# Patient Record
Sex: MALE | Race: WHITE | NOT HISPANIC OR LATINO | Employment: FULL TIME | URBAN - METROPOLITAN AREA
[De-identification: names, ages, dates, MRNs, and addresses within clinical notes are randomized per-mention and may not be internally consistent; named-entity substitution may affect disease eponyms.]

---

## 2019-03-27 ENCOUNTER — OFFICE VISIT CONVERTED (OUTPATIENT)
Dept: FAMILY MEDICINE CLINIC | Facility: CLINIC | Age: 59
End: 2019-03-27
Attending: FAMILY MEDICINE

## 2020-08-12 ENCOUNTER — HOSPITAL ENCOUNTER (OUTPATIENT)
Dept: URGENT CARE | Facility: CLINIC | Age: 60
Discharge: HOME | End: 2020-08-12

## 2020-08-14 LAB
BACTERIA SPEC AEROBE CULT: NORMAL
SARS-COV-2 RNA SPEC QL NAA+PROBE: DETECTED

## 2020-09-29 ENCOUNTER — HOSPITAL ENCOUNTER (OUTPATIENT)
Dept: URGENT CARE | Facility: CLINIC | Age: 60
Discharge: HOME | End: 2020-09-29
Attending: FAMILY MEDICINE

## 2020-10-02 LAB — SARS-COV-2 RNA SPEC QL NAA+PROBE: NOT DETECTED

## 2020-11-25 ENCOUNTER — OFFICE VISIT CONVERTED (OUTPATIENT)
Dept: FAMILY MEDICINE CLINIC | Facility: CLINIC | Age: 60
End: 2020-11-25
Attending: FAMILY MEDICINE

## 2021-05-13 NOTE — PROGRESS NOTES
Progress Note      Patient Name: Vahid Bass   Patient ID: [id number]   Sex: Male   YOB: 1960    Primary Care Provider: Bernard Singh DO    Visit Date: November 25, 2020    Provider: Bernard Singh DO   Location: [facility]   Location Address: [address] [place] [address], [address]  [place], [place]  [id number]   Location Phone: [phone]          Chief Complaint  · paperwork       History Of Present Illness  Vahid Bass is a 60 year old /Black male who presents for evaluation and treatment of:      Presents today for checkup.  Have not seen him since March 2019.  He had gone to [place] for over a year.  He returned back in August.  He reports a couple weeks after returning back in August he did contract COVID-19.  He had a follow-up test back on September/20 9/2020 where it was not detected.  He is at work again.  He is requesting a statement whether he is high risk.  He does have morbid obesity which would be a risk factor.  Paperwork has been filled out on his behalf today.  He does have arthritis of both knees particularly the right.  He received a corticosteroid injection a couple years ago on the right knee and reports having sharp pain occasionally.  [facility]  I discussed giving him some Voltaren gel.  He is unable to do physical therapy at this time due to work constraints.  If symptoms worsen he is instructed to call or return.  He is due for labs so I will order these as well.  I will see him back in 3 months and he will get labs done prior to his next appointment.       Past Medical History  Allergies; Anemia; Arthritis; Broken Bones; Hyperlipidemia; Tuberculosis or positive PPD test         Past Surgical History  Rhinoplasty; Tonsilectomy         Medication List  aspirin 81 mg oral tablet,chewable         Allergy List  NO KNOWN DRUG ALLERGIES       Allergies Reconciled  Family Medical History  Stroke; Heart Failure; Ovarian Cancer,  "Family History; Diabetes         Social History  Disabled; lives with children; lives with spouse; Retired; Tobacco (Never)         Review of Systems     Gen: Denies any fever, chills, or weight changes  HEENT: Denies any changes in vision or hearing, denies nasal congestion, denies any neck tenderness or lymphadenopathy  Extremities: Denies edema  Psychiatric: Denies any changes in mood or affect  Neurologic: Denies any deficits  Skin: Denies any rashes  Musculoskeletal: As discussed above       Vitals  Date Time BP Position Site L\R Cuff Size HR RR TEMP (F) WT  HT  BMI kg/m2 BSA m2 O2 Sat FR L/min FiO2 HC       11/25/2020 11:15 /72 Sitting    60 - R  98.3 301lbs 3oz 5'  11\" 42.01 2.62 95 %            Physical Examination     General: AAO 3, no acute distress, pleasant  HEENT: Normocephalic, atraumatic  Cardiovascular: Regular rate and rhythm without appreciable murmur  Respiratory: Clear to auscultation bilaterally no RRW  Gastrointestinal: Soft nontender nondistended with bowel sounds present  Musculoskeletal: Right knee demonstrates no effusion, negative valgus varus stress testing, negative Bong, negative anterior posterior drawer testing.  Positive single-leg declined squat test on the right.  extremities: No clubbing, cyanosis or edema  Neurologic: CN II through XII grossly intact   Psychiatric: Normal mood and affect           Assessment  · Anemia     285.9/D64.9  · Fatigue     780.79/R53.83  · Primary osteoarthritis of both knees     715.16/M17.0  · Screening for prostate cancer     V76.44/Z12.5  · HLD (hyperlipidemia)     272.4/E78.5  · Medication monitoring encounter     V58.83/Z51.81  Plan as documented above. I will see patient back in 3 months or sooner if needed. Patient to call with any questions or concerns. He will get labs updated prior to his next appointment. I will give him Voltaren gel for right knee pain. If symptoms worsen he is instructed to call or " return.      Plan  · Orders  o PSA Ultrasensitive, ANNUAL SCREENING [facility] ([id number], ) - V76.44/Z12.5 - 02/25/2021  o CBC with Auto Diff [facility] ([id number]) - 285.9/D64.9, V58.83/Z51.81, 780.79/R53.83 - 02/25/2021  o CMP [facility] ([id number]) - V58.83/Z51.81 - 02/25/2021  o Lipid Panel [facility] ([id number]) - 272.4/E78.5 - 02/25/2021  o Thyroid Profile (31806, 98504, THYII) - V58.83/Z51.81, 780.79/R53.83 - 02/25/2021  o ACO-39: Current medications updated and reviewed (1159F, ) - - 11/25/2020  · Medications  o Voltaren 1 % topical gel   SIG: apply 4 grams to the affected area(s) by topical route 4 times per day   DISP: (100) Gram with 2 refills  Prescribed on 11/25/2020     o Medications have been Reconciled  o Transition of Care or Provider Policy  · Instructions  o Patient was educated/instructed on their diagnosis, treatment and medications prior to discharge from the clinic today.  o Patient instructed to seek medical attention urgently for new or worsening symptoms.  o Call the office with any concerns or questions.  · Disposition  o Follow Up in 3 months.            Electronically Signed by: Bernard Singh DO -Author on November 25, 2020 12:10:11 PM

## 2021-05-14 VITALS
WEIGHT: 301.18 LBS | SYSTOLIC BLOOD PRESSURE: 132 MMHG | OXYGEN SATURATION: 95 % | DIASTOLIC BLOOD PRESSURE: 72 MMHG | BODY MASS INDEX: 42.16 KG/M2 | HEIGHT: 71 IN | HEART RATE: 60 BPM | TEMPERATURE: 98.3 F

## 2021-05-15 VITALS
DIASTOLIC BLOOD PRESSURE: 82 MMHG | OXYGEN SATURATION: 95 % | TEMPERATURE: 98.7 F | WEIGHT: 290.37 LBS | HEART RATE: 72 BPM | SYSTOLIC BLOOD PRESSURE: 134 MMHG | HEIGHT: 71 IN | BODY MASS INDEX: 40.65 KG/M2

## 2021-06-17 ENCOUNTER — OFFICE VISIT (OUTPATIENT)
Dept: FAMILY MEDICINE CLINIC | Facility: CLINIC | Age: 61
End: 2021-06-17

## 2021-06-17 VITALS
BODY MASS INDEX: 40.29 KG/M2 | DIASTOLIC BLOOD PRESSURE: 78 MMHG | OXYGEN SATURATION: 98 % | SYSTOLIC BLOOD PRESSURE: 116 MMHG | HEIGHT: 71 IN | WEIGHT: 287.8 LBS | HEART RATE: 63 BPM | TEMPERATURE: 98 F

## 2021-06-17 DIAGNOSIS — M79.672 BILATERAL FOOT PAIN: Primary | ICD-10-CM

## 2021-06-17 DIAGNOSIS — M21.42 FLAT FEET, BILATERAL: ICD-10-CM

## 2021-06-17 DIAGNOSIS — Z12.11 SCREENING FOR COLON CANCER: ICD-10-CM

## 2021-06-17 DIAGNOSIS — M21.41 FLAT FEET, BILATERAL: ICD-10-CM

## 2021-06-17 DIAGNOSIS — M79.671 BILATERAL FOOT PAIN: Primary | ICD-10-CM

## 2021-06-17 PROCEDURE — 99213 OFFICE O/P EST LOW 20 MIN: CPT | Performed by: FAMILY MEDICINE

## 2021-06-17 RX ORDER — ASPIRIN 81 MG/1
81 TABLET, CHEWABLE ORAL DAILY
COMMUNITY
End: 2022-08-12 | Stop reason: HOSPADM

## 2021-06-17 NOTE — PROGRESS NOTES
"Chief Complaint  Bilateral Foot pain    Subjective          Vahid Bass presents to [facility]  History of Present Illness  Patient presents complaining of bilateral foot pain.  This has been an issue for most of his life.  He does have flat feet.  The right things bother him more than the left.  He does sometimes have pain on the bottom of his feet but does not have them now.  He reports that his pain is worse at the end of the day.  Denies any excessive pain with the first steps of the day.  [facility] but states that normally mentions it but then nothing is done about it.  He has previously seen podiatry but it has been several years ago.  He reports he has had x-rays which show arthritis.  I did offer repeat imaging however patient declines.  Depression screening has been reviewed and is negative.  Objective   Vital Signs:   /78   Pulse 63   Temp 98 °F (36.7 °C)   Ht 180.3 cm (71\")   Wt 131 kg (287 lb 12.8 oz)   SpO2 98%   BMI 40.14 kg/m²     Physical Exam   General: AAO ×3, no acute distress, pleasant  HEENT: Normocephalic, atraumatic  Musculoskeletal: Significant pes planus of both feet with the right being worse than the left.  Ankle has good range of motion.  No tenderness to palpation on both feet.  Result Review :                 Assessment and Plan    Diagnoses and all orders for this visit:    1. Bilateral foot pain (Primary)  -     Ambulatory Referral to Podiatry    2. Screening for colon cancer    3. Flat feet, bilateral  -     Ambulatory Referral to Podiatry    Other orders  -     Diclofenac Sodium (Voltaren) 1 % gel gel; Apply to affected area QID prn pain  Dispense: 100 g; Refill: 3    I discussed with patient referral to podiatry.  I will give him Voltaren gel to help with pain.  I discussed with patient getting better supportive shoes to help with his pes planus.  I will see patient back in about 2 months.  He does appear " to have some issues with plantar fasciitis however I believe the main issue he is having is pain from pes planus.  He would like to hold off on x-rays at this time.  If he changes his mind he is to let us know.  His colonoscopy is up-to-date from 2018.  Repeat will be in 5 years, 2023.    Follow Up   Return in about 2 months (around 8/17/2021) for bilateral foot pain.  Patient was given instructions and counseling regarding his condition or for health maintenance advice. Please see specific information pulled into the AVS if appropriate.

## 2021-12-07 ENCOUNTER — TELEPHONE (OUTPATIENT)
Dept: FAMILY MEDICINE CLINIC | Facility: CLINIC | Age: 61
End: 2021-12-07

## 2021-12-07 NOTE — TELEPHONE ENCOUNTER
Pt had requested to transfer care from Dr Singh to Bucky Rao. This transfer has been approved by both provider. Unable to reach pt detailed message was left and if pt had any question he could call back too [phone].

## 2022-05-06 ENCOUNTER — OFFICE VISIT (OUTPATIENT)
Dept: FAMILY MEDICINE CLINIC | Facility: CLINIC | Age: 62
End: 2022-05-06

## 2022-05-06 VITALS
DIASTOLIC BLOOD PRESSURE: 88 MMHG | OXYGEN SATURATION: 97 % | HEART RATE: 63 BPM | SYSTOLIC BLOOD PRESSURE: 140 MMHG | WEIGHT: 300.6 LBS | BODY MASS INDEX: 42.08 KG/M2 | HEIGHT: 71 IN | TEMPERATURE: 98.8 F

## 2022-05-06 DIAGNOSIS — G89.29 CHRONIC PAIN OF BOTH KNEES: Primary | ICD-10-CM

## 2022-05-06 DIAGNOSIS — E66.01 CLASS 3 SEVERE OBESITY DUE TO EXCESS CALORIES WITH SERIOUS COMORBIDITY AND BODY MASS INDEX (BMI) OF 40.0 TO 44.9 IN ADULT: ICD-10-CM

## 2022-05-06 DIAGNOSIS — M25.571 CHRONIC PAIN OF BOTH ANKLES: ICD-10-CM

## 2022-05-06 DIAGNOSIS — G89.29 CHRONIC PAIN OF BOTH ANKLES: ICD-10-CM

## 2022-05-06 DIAGNOSIS — M25.562 CHRONIC PAIN OF BOTH KNEES: Primary | ICD-10-CM

## 2022-05-06 DIAGNOSIS — M25.561 CHRONIC PAIN OF BOTH KNEES: Primary | ICD-10-CM

## 2022-05-06 DIAGNOSIS — M25.572 CHRONIC PAIN OF BOTH ANKLES: ICD-10-CM

## 2022-05-06 PROBLEM — J01.80 OTHER ACUTE SINUSITIS: Status: ACTIVE | Noted: 2022-05-06

## 2022-05-06 PROBLEM — M19.90 ARTHRITIS: Status: ACTIVE | Noted: 2022-05-06

## 2022-05-06 PROBLEM — E78.5 HYPERLIPIDEMIA: Status: ACTIVE | Noted: 2022-05-06

## 2022-05-06 PROBLEM — D64.9 ANEMIA: Status: ACTIVE | Noted: 2022-05-06

## 2022-05-06 PROCEDURE — 99213 OFFICE O/P EST LOW 20 MIN: CPT | Performed by: NURSE PRACTITIONER

## 2022-05-06 RX ORDER — TAMSULOSIN HYDROCHLORIDE 0.4 MG/1
1 CAPSULE ORAL NIGHTLY
COMMUNITY
End: 2022-09-23

## 2022-05-06 NOTE — ASSESSMENT & PLAN NOTE
Patient's (Body mass index is 41.93 kg/m².) indicates that they are morbidly obese (BMI > 40 or > 35 with obesity - related health condition) with health conditions that include hypertension, dyslipidemias and osteoarthritis . Weight is unchanged. BMI is is above average; BMI management plan is completed. We discussed portion control and increasing exercise.

## 2022-05-06 NOTE — PROGRESS NOTES
"Chief Complaint  Ankle Pain (bilateral) and Knee Pain (Bilateral knees)    Subjective          Vahid Bass presents to [facility]  History of Present Illness  Presents today for an acute visit for bilateral knee and ankle pain.  [facility].  Patient reports 2 to 3 weeks ago he had [facility].  He states he has been on nonsteroidal anti-inflammatories and received joint injections.  He is requesting to see orthopedics and a podiatrist for his ankle and knee pain.  He is having decreased mobility due to the chronic pain.    Objective   Vital Signs:  /88   Pulse 63   Temp 98.8 °F (37.1 °C)   Ht 180.3 cm (71\")   Wt (!) 136 kg (300 lb 9.6 oz)   SpO2 97%   BMI 41.93 kg/m²           Physical Exam  Vitals reviewed.   Constitutional:       Appearance: Normal appearance. He is morbidly obese.   HENT:      Head: Normocephalic and atraumatic.      Right Ear: External ear normal.      Left Ear: External ear normal.      Mouth/Throat:      Pharynx: No oropharyngeal exudate.   Eyes:      Conjunctiva/sclera: Conjunctivae normal.      Pupils: Pupils are equal, round, and reactive to light.   Cardiovascular:      Rate and Rhythm: Normal rate and regular rhythm.      Heart sounds: No murmur heard.    No friction rub. No gallop.   Pulmonary:      Effort: Pulmonary effort is normal.      Breath sounds: Normal breath sounds. No wheezing or rhonchi.   Abdominal:      General: Bowel sounds are normal. There is no distension.      Palpations: Abdomen is soft.      Tenderness: There is no abdominal tenderness.   Skin:     General: Skin is warm and dry.   Neurological:      Mental Status: He is alert and oriented to person, place, and time.      Cranial Nerves: No cranial nerve deficit.   Psychiatric:         Mood and Affect: Mood and affect normal.         Behavior: Behavior normal.         Thought Content: Thought content " normal.         Judgment: Judgment normal.        Result Review :                 Assessment and Plan    Diagnoses and all orders for this visit:    1. Chronic pain of both knees (Primary)  -     Ambulatory Referral to Orthopedic Surgery    2. Chronic pain of both ankles  -     Ambulatory Referral to Podiatry    3. Class 3 severe obesity due to excess calories with serious comorbidity and body mass index (BMI) of 40.0 to 44.9 in adult (HCC)  Assessment & Plan:  Patient's (Body mass index is 41.93 kg/m².) indicates that they are morbidly obese (BMI > 40 or > 35 with obesity - related health condition) with health conditions that include hypertension, dyslipidemias and osteoarthritis . Weight is unchanged. BMI is is above average; BMI management plan is completed. We discussed portion control and increasing exercise.       He will picked up the x-rays of his knee from a couple weeks ago.  Put in consult for orthopedic and podiatry.  Discussed encourage weight loss.  Healthy eating and exercising.       Follow Up   Return in about 4 weeks (around 6/3/2022), or if symptoms worsen or fail to improve, for Next scheduled follow up.  Patient was given instructions and counseling regarding his condition or for health maintenance advice. Please see specific information pulled into the AVS if appropriate.

## 2022-05-13 ENCOUNTER — OFFICE VISIT (OUTPATIENT)
Dept: ORTHOPEDIC SURGERY | Facility: CLINIC | Age: 62
End: 2022-05-13

## 2022-05-13 ENCOUNTER — PREP FOR SURGERY (OUTPATIENT)
Dept: OTHER | Facility: HOSPITAL | Age: 62
End: 2022-05-13

## 2022-05-13 VITALS — HEART RATE: 61 BPM | OXYGEN SATURATION: 97 % | HEIGHT: 71 IN | BODY MASS INDEX: 42.28 KG/M2 | WEIGHT: 302 LBS

## 2022-05-13 DIAGNOSIS — M25.561 PAIN IN BOTH KNEES, UNSPECIFIED CHRONICITY: Primary | ICD-10-CM

## 2022-05-13 DIAGNOSIS — M17.0 PRIMARY OSTEOARTHRITIS OF KNEES, BILATERAL: Primary | ICD-10-CM

## 2022-05-13 DIAGNOSIS — M17.0 PRIMARY OSTEOARTHRITIS OF KNEES, BILATERAL: ICD-10-CM

## 2022-05-13 DIAGNOSIS — M25.562 PAIN IN BOTH KNEES, UNSPECIFIED CHRONICITY: Primary | ICD-10-CM

## 2022-05-13 PROBLEM — M17.12 OSTEOARTHRITIS OF LEFT KNEE: Status: ACTIVE | Noted: 2022-05-13

## 2022-05-13 PROCEDURE — 99203 OFFICE O/P NEW LOW 30 MIN: CPT | Performed by: ORTHOPAEDIC SURGERY

## 2022-05-13 RX ORDER — POVIDONE-IODINE 10 MG/ML
SOLUTION TOPICAL ONCE
Status: CANCELLED | OUTPATIENT
Start: 2022-05-13 | End: 2022-05-13

## 2022-05-13 RX ORDER — CEFAZOLIN SODIUM 2 G/100ML
2 INJECTION, SOLUTION INTRAVENOUS ONCE
Status: CANCELLED | OUTPATIENT
Start: 2022-05-13 | End: 2022-05-13

## 2022-05-13 RX ORDER — TRANEXAMIC ACID 10 MG/ML
1000 INJECTION, SOLUTION INTRAVENOUS ONCE
Status: CANCELLED | OUTPATIENT
Start: 2022-05-13 | End: 2022-05-13

## 2022-05-13 NOTE — PROGRESS NOTES
"Chief Complaint  Initial Evaluation of the Left Knee and Initial Evaluation of the Right Knee     Subjective      Vahid Bass presents to [facility] for evaluation of the bilateral knees. The patient reports bilateral knee pain. He is retired from the . [facility]. He reports the last steroid injection was in 2019. He has no other complaints. He is no longer getting relief from injections.     No Known Allergies     Social History     Socioeconomic History   • Marital status:    Tobacco Use   • Smoking status: Never Smoker   • Smokeless tobacco: Never Used   Vaping Use   • Vaping Use: Never used        Review of Systems     Objective   Vital Signs:   Pulse 61   Ht 180.3 cm (71\")   Wt (!) 137 kg (302 lb)   SpO2 97%   BMI 42.12 kg/m²       Physical Exam  Constitutional:       Appearance: Normal appearance. The patient is well-developed and normal weight.   HENT:      Head: Normocephalic.      Right Ear: Hearing and external ear normal.      Left Ear: Hearing and external ear normal.      Nose: Nose normal.   Eyes:      Conjunctiva/sclera: Conjunctivae normal.   Cardiovascular:      Rate and Rhythm: Normal rate.   Pulmonary:      Effort: Pulmonary effort is normal.      Breath sounds: No wheezing or rales.   Abdominal:      Palpations: Abdomen is soft.      Tenderness: There is no abdominal tenderness.   Musculoskeletal:      Cervical back: Normal range of motion.   Skin:     Findings: No rash.   Neurological:      Mental Status: The patient is alert and oriented to person, place, and time.   Psychiatric:         Mood and Affect: Mood and affect normal.         Judgment: Judgment normal.       Ortho Exam      Left knee- 10 degree varus deformity. Positive EHL, FHL, GS and TA. Sensation intact to all 5 nerves of the foot. Positive pulses. Stable to anterior/posterior drawer. Mild laxity to varus/valgus stress. ROM -5 to 115 degrees. Negative " Lachman's. Negative Bong's. Plantar- valgus deformity     Right knee- 10 degree varus deformity. Positive EHL, FHL, GS and TA. Sensation intact to all 5 nerves of the foot. Positive pulses. Stable to anterior/posterior drawer. Stable to varus/valgus stress. ROM -4 to 115 degrees. Negative Lachman's. Negative Bong's. Plantar- valgus deformity     Procedures    X-Ray Report:  Bilateral knee(s) X-Ray  Indication: Evaluation of bilateral knee pain  AP, Lateral, Standing and Sunrise view(s)  Findings: advanced degenerative changes with bone on bone articulation to the medial knee with varus deformity.   Prior studies available for comparison: no         Imaging Results (Most Recent)     Procedure Component Value Units Date/Time    XR Knee 3 View Bilateral [[id number]] Resulted: 05/13/22 0850     Updated: 05/13/22 0857           Result Review :       No results found.           Assessment and Plan     Diagnoses and all orders for this visit:    1. Pain in both knees, unspecified chronicity (Primary)  -     XR Knee 3 View Bilateral    2. Primary osteoarthritis of knees, bilateral        Discussed the treatment options with the patient, operative vs non-operative. Discussed the risks and benefits of a total knee replacement. Discussed the risks and benefits of a Left Total Knee Arthroplasty. The patient expressed understanding and wished to proceed.     Educated on risk of elevated BMI related to procedure.  Discussed options for weight loss/decreasing BMI prior to procedure including dietician consult, weight loss options and exercise program., Discussed surgery., Risks/benefits discussed with patient including, but not limited to: infection, bleeding, neurovascular damage, malunion, nonunion, aesthetic deformity, need for further surgery, and death., Discussed with patient the implant type being used during surgery and patient understands and desires to proceed., Surgery pamphlet given. and Call or return if  worsening symptoms.    Follow Up     2 weeks postoperatively.        Patient was given instructions and counseling regarding his condition or for health maintenance advice. Please see specific information pulled into the AVS if appropriate.     Scribed for Niles Amezquita MD by Neeru Zavaleta.  05/13/22   09:17 EDT    I have personally performed the services described in this document as scribed by the above individual and it is both accurate and complete. Niles Amezquita MD 05/15/22

## 2022-06-24 ENCOUNTER — TELEPHONE (OUTPATIENT)
Dept: FAMILY MEDICINE CLINIC | Facility: CLINIC | Age: 62
End: 2022-06-24

## 2022-06-24 ENCOUNTER — OFFICE VISIT (OUTPATIENT)
Dept: PODIATRY | Facility: CLINIC | Age: 62
End: 2022-06-24

## 2022-06-24 VITALS
HEART RATE: 59 BPM | BODY MASS INDEX: 41.55 KG/M2 | SYSTOLIC BLOOD PRESSURE: 134 MMHG | HEIGHT: 71 IN | WEIGHT: 296.8 LBS | DIASTOLIC BLOOD PRESSURE: 68 MMHG | TEMPERATURE: 95.3 F | OXYGEN SATURATION: 100 %

## 2022-06-24 DIAGNOSIS — M79.671 FOOT PAIN, RIGHT: Primary | ICD-10-CM

## 2022-06-24 DIAGNOSIS — M76.821 POSTERIOR TIBIAL TENDON DYSFUNCTION (PTTD) OF RIGHT LOWER EXTREMITY: ICD-10-CM

## 2022-06-24 DIAGNOSIS — M19.079 ANKLE ARTHRITIS: ICD-10-CM

## 2022-06-24 PROCEDURE — 99243 OFF/OP CNSLTJ NEW/EST LOW 30: CPT | Performed by: PODIATRIST

## 2022-06-24 NOTE — PROGRESS NOTES
[facility] - [facility]ODIATRY    Today's Date: 06/24/22    Patient Name: Vahid Bass  MRN: [id number]  CSN: [id number]  PCP: Rodolfo Rao APRN  Referring Provider: Rodolfo Rao APRN    SUBJECTIVE     Chief Complaint   Patient presents with   • Right Foot - Pain, Establish Care     HPI: Vahid Bass, a 61 y.o.male, presents to clinic.    New, Established, New Problem: New    Location: Right ankle    Duration:  2020    Onset: Insidious    Nature: Sore, sharp, achy    Stable, worsening, improving: Slowly worsening    Aggravating factors:  Patient relates pain is aggravated by shoe gear and ambulation.      Previous Treatment: None on right ankle.  Patient relates upcoming left knee replacement by Dr. Amezquita scheduled in August 2022    Patient denies any fevers, chills, nausea, vomiting, shortness of breath, nor any other constitutional signs nor symptoms.    No other pedal complaints at this time.      Past Medical History:   Diagnosis Date   • Allergies    • Anemia    • Arthritis    • Broken bones    • Foot pain, right    • Hyperlipidemia    • Positive skin test for tuberculosis     or positive PPD; INH x 9 months     Past Surgical History:   Procedure Laterality Date   • RHINOPLASTY  1999   • TONSILLECTOMY  1999     Family History   Problem Relation Age of Onset   • Cancer Mother         ovarian   • Stroke Mother    • Ovarian cancer Mother    • Diabetes Father    • Heart failure Sister    • Heart disease Brother      Social History     Socioeconomic History   • Marital status:    Tobacco Use   • Smoking status: Never Smoker   • Smokeless tobacco: Never Used   Vaping Use   • Vaping Use: Never used   Substance and Sexual Activity   • Alcohol use: Yes     Comment: socially   • Drug use: Never   • Sexual activity: Defer     No Known Allergies  Current Outpatient Medications   Medication Sig Dispense Refill   • aspirin 81 MG chewable tablet aspirin 81 mg oral tablet,chewable chew 1 tablet  (81 mg) by oral route once daily   Active     • tamsulosin (FLOMAX) 0.4 MG capsule 24 hr capsule Take 1 capsule by mouth Daily.       No current facility-administered medications for this visit.     Review of Systems   Constitutional: Negative.    Musculoskeletal:        Painful right ankle   All other systems reviewed and are negative.      OBJECTIVE     Vitals:    22 0739   BP: 134/68   Pulse: 59   Temp: 95.3 °F (35.2 °C)   SpO2: 100%       No results found for: WBC, RBC, HGB, HCT, MCV, MCH, MCHC, RDW, RDWSD, MPV, PLT, NEUTRORELPCT, LYMPHORELPCT, MONORELPCT, EOSRELPCT, BASORELPCT, AUTOIGPER, NEUTROABS, LYMPHSABS, MONOSABS, EOSABS, BASOSABS, AUTOIGNUM, NRBC      No results found for: GLUCOSE, BUN, CREATININE, EGFRIFNONA, EGFRIFAFRI, BCR, K, CO2, CALCIUM, PROTENTOTREF, ALBUMIN, LABIL2, BILIRUBIN, AST, ALT    Patient seen in no apparent distress.      PHYSICAL EXAM:     Foot/Ankle Exam:       General:   Appearance: obesity    Orientation: AAOx3    Affect: appropriate    Gait: unimpaired    Shoe Gear:  Casual shoes    VASCULAR      Right Foot Vascularity   Normal vascular exam    Dorsalis pedis:  1+  Posterior tibial:  1+  Skin Temperature: warm    Edema Gradin+ and non-pitting  CFT:  < 3 seconds  Pedal Hair Growth:  Absent  Varicosities: mild varicosities       Left Foot Vascularity   Normal vascular exam    Dorsalis pedis:  1+  Posterior tibial:  1+  Skin Temperature: warm    Edema Gradin+ and non-pitting  CFT:  < 3 seconds  Pedal Hair Growth:  Present  Varicosities: mild varicosities        NEUROLOGIC     Right Foot Neurologic   Normal sensation    Light touch sensation:  Normal  Vibratory sensation:  Normal  Hot/Cold sensation: normal    Protective Sensation using [place] Monofilament:  10     Left Foot Neurologic   Normal sensation    Light touch sensation:  Normal  Vibratory sensation:  Normal  Hot/cold sensation: normal    Protective Sensation using [place] Monofilament:   10     MUSCULOSKELETAL      Right Foot Musculoskeletal   Ecchymosis:  None  Tenderness comment:  Right ankle and along the posterior tibial tendon track.  No signs of edema, erythema, lymphangitis, nor signs of infection.    Arch:  Pes planus     MUSCLE STRENGTH     Right Foot Muscle Strength   Foot dorsiflexion:  4-  Foot plantar flexion:  4-  Foot inversion:  1  Foot eversion:  4-     Left Foot Muscle Strength   Foot dorsiflexion:  4  Foot plantar flexion:  4  Foot inversion:  4  Foot eversion:  4     RANGE OF MOTION      Right Foot Range of Motion   Foot and ankle ROM within normal limits    Right ankle active dorsiflexion: Limited.  ankle dorsiflexion pain    plantar flexion pain       Left Foot Range of Motion   Foot and ankle ROM within normal limits       DERMATOLOGIC     Right Foot Dermatologic   Skin: skin intact    Nails: normal       Left Foot Dermatologic   Skin: skin intact    Nails: normal        RADIOLOGY:      XR Ankle 3+ View Right    Result Date: 6/24/2022  Narrative: IN-OFFICE IMAGING:  Standing, weightbearing, 3 view, AP, MO, Lateral, right ankle Indication: Ankle pain Findings: Significant pes planus.  Significant degenerative changes seen through the ankle and midfoot midfoot.  Almost complete resorption of the talar head.  Inferior calcaneal enthesopathy.  Otherwise, no periosteal reactions nor osteolytic changes seen.  No occult fractures seen. Comparison: No comparison views available.       ASSESSMENT/PLAN     Diagnoses and all orders for this visit:    1. Foot pain, right (Primary)    2. Ankle arthritis  -     Ambulatory Referral to Orthopedic Surgery    3. Posterior tibial tendon dysfunction (PTTD) of right lower extremity        Comprehensive lower extremity examination and evaluation was performed.    Discussed findings and treatment plan including risks, benefits, and treatment options with patient in detail. Patient agreed with treatment plan.    Medications and allergies reviewed.   Reviewed available lab values along with other pertinent labs.  These were discussed with the patient.    The need for a referral to another physician was discussed with the patient.  They state understanding and agreement with this plan.  The patient is to referred to Dr. Armando Mccall for evaluation for possible right ankle fusion and/or pantalar fusion.    An After Visit Summary was printed and given to the patient at discharge, including (if requested) any available informative/educational handouts regarding diagnosis, treatment, or medications. All questions were answered to patient/family satisfaction. Should symptoms fail to improve or worsen they agree to call or return to clinic or to go to the Emergency Department. Discussed the importance of following up with any needed screening tests/labs/specialist appointments and any requested follow-up recommended by me today. Importance of maintaining follow-up discussed and patient accepts that missed appointments can delay diagnosis and potentially lead to worsening of conditions.    Return if symptoms worsen or fail to improve., or sooner if acute issues arise.    This document has been electronically signed by Jerome Valladares DPM on June 24, 2022 08:33 EDT

## 2022-06-26 DIAGNOSIS — M76.821 POSTERIOR TIBIAL TENDON DYSFUNCTION (PTTD) OF RIGHT LOWER EXTREMITY: Primary | ICD-10-CM

## 2022-07-19 DIAGNOSIS — M17.0 PRIMARY OSTEOARTHRITIS OF KNEES, BILATERAL: Primary | ICD-10-CM

## 2022-07-19 DIAGNOSIS — Z47.1 AFTERCARE FOLLOWING LEFT KNEE JOINT REPLACEMENT SURGERY: Primary | ICD-10-CM

## 2022-07-19 DIAGNOSIS — Z96.652 AFTERCARE FOLLOWING LEFT KNEE JOINT REPLACEMENT SURGERY: Primary | ICD-10-CM

## 2022-07-29 DIAGNOSIS — Z01.818 ENCOUNTER FOR PREADMISSION TESTING: Primary | ICD-10-CM

## 2022-07-29 RX ORDER — TRANEXAMIC ACID 10 MG/ML
2000 INJECTION, SOLUTION INTRAVENOUS ONCE
Status: CANCELLED | OUTPATIENT
Start: 2022-07-29

## 2022-08-01 ENCOUNTER — PRE-ADMISSION TESTING (OUTPATIENT)
Dept: PREADMISSION TESTING | Facility: HOSPITAL | Age: 62
End: 2022-08-01

## 2022-08-01 VITALS
HEIGHT: 71 IN | BODY MASS INDEX: 41.36 KG/M2 | TEMPERATURE: 97.3 F | DIASTOLIC BLOOD PRESSURE: 82 MMHG | WEIGHT: 295.42 LBS | SYSTOLIC BLOOD PRESSURE: 142 MMHG | HEART RATE: 62 BPM | OXYGEN SATURATION: 95 % | RESPIRATION RATE: 16 BRPM

## 2022-08-01 DIAGNOSIS — M17.0 PRIMARY OSTEOARTHRITIS OF KNEES, BILATERAL: ICD-10-CM

## 2022-08-01 DIAGNOSIS — Z01.818 ENCOUNTER FOR PREADMISSION TESTING: ICD-10-CM

## 2022-08-01 LAB
ALBUMIN SERPL-MCNC: 4 G/DL (ref 3.5–5.2)
ALBUMIN/GLOB SERPL: 1.4 G/DL
ALP SERPL-CCNC: 49 U/L (ref 39–117)
ALT SERPL W P-5'-P-CCNC: 11 U/L (ref 1–41)
ANION GAP SERPL CALCULATED.3IONS-SCNC: 6.6 MMOL/L (ref 5–15)
AST SERPL-CCNC: 11 U/L (ref 1–40)
BASOPHILS # BLD AUTO: 0.03 10*3/MM3 (ref 0–0.2)
BASOPHILS NFR BLD AUTO: 0.5 % (ref 0–1.5)
BILIRUB SERPL-MCNC: 0.2 MG/DL (ref 0–1.2)
BUN SERPL-MCNC: 14 MG/DL (ref 8–23)
BUN/CREAT SERPL: 13.7 (ref 7–25)
CALCIUM SPEC-SCNC: 9.7 MG/DL (ref 8.6–10.5)
CHLORIDE SERPL-SCNC: 109 MMOL/L (ref 98–107)
CO2 SERPL-SCNC: 24.4 MMOL/L (ref 22–29)
CREAT SERPL-MCNC: 1.02 MG/DL (ref 0.76–1.27)
DEPRECATED RDW RBC AUTO: 43.8 FL (ref 37–54)
EGFRCR SERPLBLD CKD-EPI 2021: 82.1 ML/MIN/1.73
EOSINOPHIL # BLD AUTO: 0.2 10*3/MM3 (ref 0–0.4)
EOSINOPHIL NFR BLD AUTO: 3.2 % (ref 0.3–6.2)
ERYTHROCYTE [DISTWIDTH] IN BLOOD BY AUTOMATED COUNT: 14.6 % (ref 12.3–15.4)
GLOBULIN UR ELPH-MCNC: 2.9 GM/DL
GLUCOSE SERPL-MCNC: 95 MG/DL (ref 65–99)
HBA1C MFR BLD: 5.5 % (ref 4.8–5.6)
HCT VFR BLD AUTO: 37.6 % (ref 37.5–51)
HGB BLD-MCNC: 12.4 G/DL (ref 13–17.7)
IMM GRANULOCYTES # BLD AUTO: 0.02 10*3/MM3 (ref 0–0.05)
IMM GRANULOCYTES NFR BLD AUTO: 0.3 % (ref 0–0.5)
INR PPP: 1.05 (ref 0.86–1.15)
LYMPHOCYTES # BLD AUTO: 2.36 10*3/MM3 (ref 0.7–3.1)
LYMPHOCYTES NFR BLD AUTO: 37.8 % (ref 19.6–45.3)
MCH RBC QN AUTO: 27.1 PG (ref 26.6–33)
MCHC RBC AUTO-ENTMCNC: 33 G/DL (ref 31.5–35.7)
MCV RBC AUTO: 82.3 FL (ref 79–97)
MONOCYTES # BLD AUTO: 0.46 10*3/MM3 (ref 0.1–0.9)
MONOCYTES NFR BLD AUTO: 7.4 % (ref 5–12)
NEUTROPHILS NFR BLD AUTO: 3.18 10*3/MM3 (ref 1.7–7)
NEUTROPHILS NFR BLD AUTO: 50.8 % (ref 42.7–76)
NRBC BLD AUTO-RTO: 0 /100 WBC (ref 0–0.2)
PLATELET # BLD AUTO: 257 10*3/MM3 (ref 140–450)
PMV BLD AUTO: 10 FL (ref 6–12)
POTASSIUM SERPL-SCNC: 3.8 MMOL/L (ref 3.5–5.2)
PROT SERPL-MCNC: 6.9 G/DL (ref 6–8.5)
PROTHROMBIN TIME: 13.9 SECONDS (ref 11.8–14.9)
RBC # BLD AUTO: 4.57 10*6/MM3 (ref 4.14–5.8)
SODIUM SERPL-SCNC: 140 MMOL/L (ref 136–145)
WBC NRBC COR # BLD: 6.25 10*3/MM3 (ref 3.4–10.8)

## 2022-08-01 PROCEDURE — 83036 HEMOGLOBIN GLYCOSYLATED A1C: CPT

## 2022-08-01 PROCEDURE — 36415 COLL VENOUS BLD VENIPUNCTURE: CPT

## 2022-08-01 PROCEDURE — 93005 ELECTROCARDIOGRAM TRACING: CPT

## 2022-08-01 PROCEDURE — 85610 PROTHROMBIN TIME: CPT

## 2022-08-01 PROCEDURE — 80053 COMPREHEN METABOLIC PANEL: CPT

## 2022-08-01 PROCEDURE — 85025 COMPLETE CBC W/AUTO DIFF WBC: CPT

## 2022-08-01 RX ORDER — CHOLECALCIFEROL (VITAMIN D3) 25 MCG
1000 CAPSULE ORAL
COMMUNITY

## 2022-08-01 ASSESSMENT — KOOS JR
KOOS JR SCORE: 31.307
KOOS JR SCORE: 22

## 2022-08-01 NOTE — SIGNIFICANT NOTE
PT ATTENDED TOTAL JOINT EDUCATION CLASS. EDUCATIONAL MATERIALS GIVEN AND SURGICAL SOAP. PT VERBALIZED UNDERSTANDING

## 2022-08-01 NOTE — DISCHARGE INSTRUCTIONS
IMPORTANT INSTRUCTIONS - PRE-ADMISSION TESTING  DO NOT EAT OR CHEW anything after midnight the night before your procedure.    You may have CLEAR liquids up to ____3__ hours prior to ARRIVAL time. INCLUDES 20 OZ GATORADE, NO RED  Take the following medications the morning of your procedure with JUST A SIP OF WATER:  _NONE______________________________________________________________________________________________________________________________________________________________________________________    DO NOT BRING your medications to the hospital with you, UNLESS something has changed since your PRE-Admission Testing appointment.  Hold all vitamins, supplements, and NSAIDS (Non- steroidal anti-inflammatory meds) for one week prior to surgery (you MAY take Tylenol or Acetaminophen). HOLD AFTER 8/3/22  If you are diabetic, check your blood sugar the morning of your procedure. If it is less than 70 or if you are feeling symptomatic, call the following number for further instructions: [phone] SAME DAY SURGERY WILL CALL YOUR ARRIVAL TIME 8/09/22 BY 4 P.M.______.  Use your inhalers/nebulizers as usual, the morning of your procedure. BRING YOUR INHALERS with you. NA  Bring your CPAP or BIPAP to hospital, ONLY IF YOU WILL BE SPENDING THE NIGHT.   Make sure you have a ride home and have someone who will stay with you the day of your procedure after you go home.  If you have any questions, please call your Pre-Admission Testing Nurse, _______JON_________ at [phone]___________.   SHOWER AS SCHEDULED AND DIRECTED ON PAGE 9 OF TOTAL JOINT BOOK.  BRING TOTAL JOINT EDUCATION BOOK  BACK WITH YOU THE DAY OF SURGERY.  Per anesthesia request, do not smoke for 24 hours before your procedure or as instructed by your surgeon.

## 2022-08-03 ENCOUNTER — ANESTHESIA EVENT (OUTPATIENT)
Dept: PERIOP | Facility: HOSPITAL | Age: 62
End: 2022-08-03

## 2022-08-04 ENCOUNTER — TELEPHONE (OUTPATIENT)
Dept: ORTHOPEDIC SURGERY | Facility: CLINIC | Age: 62
End: 2022-08-04

## 2022-08-04 NOTE — TELEPHONE ENCOUNTER
CALLED PATIENT AND LEFT A MESSAGE FOR PATIENT TO CALL ME.  PER DR GUPTA, PCP PATIENT CAN HOLD/STOP ASPIRIN FOR 3 DAYS PRIOR TO SURGERY

## 2022-08-09 LAB — QT INTERVAL: 436 MS

## 2022-08-10 ENCOUNTER — APPOINTMENT (OUTPATIENT)
Dept: GENERAL RADIOLOGY | Facility: HOSPITAL | Age: 62
End: 2022-08-10

## 2022-08-10 ENCOUNTER — ANESTHESIA (OUTPATIENT)
Dept: PERIOP | Facility: HOSPITAL | Age: 62
End: 2022-08-10

## 2022-08-10 ENCOUNTER — HOSPITAL ENCOUNTER (OUTPATIENT)
Facility: HOSPITAL | Age: 62
Discharge: HOME | End: 2022-08-12
Attending: ORTHOPAEDIC SURGERY | Admitting: ORTHOPAEDIC SURGERY

## 2022-08-10 DIAGNOSIS — R26.2 DIFFICULTY IN WALKING: Primary | ICD-10-CM

## 2022-08-10 DIAGNOSIS — Z78.9 DECREASED ACTIVITIES OF DAILY LIVING (ADL): ICD-10-CM

## 2022-08-10 DIAGNOSIS — M17.0 PRIMARY OSTEOARTHRITIS OF KNEES, BILATERAL: ICD-10-CM

## 2022-08-10 PROBLEM — Z96.652 S/P TOTAL KNEE REPLACEMENT, LEFT: Status: ACTIVE | Noted: 2022-08-10

## 2022-08-10 PROBLEM — M17.12 OSTEOARTHRITIS OF LEFT KNEE, UNSPECIFIED OSTEOARTHRITIS TYPE: Status: ACTIVE | Noted: 2022-08-10

## 2022-08-10 PROCEDURE — 25010000002 PROPOFOL 10 MG/ML EMULSION: Performed by: NURSE ANESTHETIST, CERTIFIED REGISTERED

## 2022-08-10 PROCEDURE — 25010000002 MORPHINE (PF) 10 MG/ML SOLUTION: Performed by: ORTHOPAEDIC SURGERY

## 2022-08-10 PROCEDURE — 94799 UNLISTED PULMONARY SVC/PX: CPT

## 2022-08-10 PROCEDURE — 97161 PT EVAL LOW COMPLEX 20 MIN: CPT

## 2022-08-10 PROCEDURE — 94761 N-INVAS EAR/PLS OXIMETRY MLT: CPT

## 2022-08-10 PROCEDURE — 25010000002 ONDANSETRON PER 1 MG: Performed by: NURSE ANESTHETIST, CERTIFIED REGISTERED

## 2022-08-10 PROCEDURE — 27447 TOTAL KNEE ARTHROPLASTY: CPT | Performed by: ORTHOPAEDIC SURGERY

## 2022-08-10 PROCEDURE — C1713 ANCHOR/SCREW BN/BN,TIS/BN: HCPCS | Performed by: ORTHOPAEDIC SURGERY

## 2022-08-10 PROCEDURE — 99203 OFFICE O/P NEW LOW 30 MIN: CPT | Performed by: INTERNAL MEDICINE

## 2022-08-10 PROCEDURE — 25010000002 ROPIVACAINE PER 1 MG: Performed by: ORTHOPAEDIC SURGERY

## 2022-08-10 PROCEDURE — 20985 CPTR-ASST DIR MS PX: CPT | Performed by: ORTHOPAEDIC SURGERY

## 2022-08-10 PROCEDURE — 73560 X-RAY EXAM OF KNEE 1 OR 2: CPT

## 2022-08-10 PROCEDURE — 25010000002 KETOROLAC TROMETHAMINE PER 15 MG: Performed by: ORTHOPAEDIC SURGERY

## 2022-08-10 PROCEDURE — 25010000002 EPINEPHRINE 1 MG/ML SOLUTION: Performed by: ORTHOPAEDIC SURGERY

## 2022-08-10 PROCEDURE — C1776 JOINT DEVICE (IMPLANTABLE): HCPCS | Performed by: ORTHOPAEDIC SURGERY

## 2022-08-10 PROCEDURE — 25010000002 CEFAZOLIN PER 500 MG: Performed by: ORTHOPAEDIC SURGERY

## 2022-08-10 PROCEDURE — 27447 TOTAL KNEE ARTHROPLASTY: CPT | Performed by: PHYSICIAN ASSISTANT

## 2022-08-10 PROCEDURE — 25010000002 FENTANYL CITRATE (PF) 50 MCG/ML SOLUTION: Performed by: NURSE ANESTHETIST, CERTIFIED REGISTERED

## 2022-08-10 PROCEDURE — 76942 ECHO GUIDE FOR BIOPSY: CPT | Performed by: ORTHOPAEDIC SURGERY

## 2022-08-10 PROCEDURE — 25010000002 DEXAMETHASONE PER 1 MG: Performed by: NURSE ANESTHETIST, CERTIFIED REGISTERED

## 2022-08-10 DEVICE — COMP FEM/KN PERSONA CR CMT COCR STD SZ11 LT: Type: IMPLANTABLE DEVICE | Site: KNEE | Status: FUNCTIONAL

## 2022-08-10 DEVICE — IMPLANTABLE DEVICE: Type: IMPLANTABLE DEVICE | Site: KNEE | Status: FUNCTIONAL

## 2022-08-10 DEVICE — STEM TIB/KN PERSONA CMT 5D SZF LT: Type: IMPLANTABLE DEVICE | Site: KNEE | Status: FUNCTIONAL

## 2022-08-10 DEVICE — CAP TOTL KN CMT PRIMARY W/ROSA: Type: IMPLANTABLE DEVICE | Site: KNEE | Status: FUNCTIONAL

## 2022-08-10 DEVICE — CMT BONE PALACOS R HI/VISC 1X40: Type: IMPLANTABLE DEVICE | Site: KNEE | Status: FUNCTIONAL

## 2022-08-10 DEVICE — ART/SRF KN PERSONA/VE PS EF 8TO11 12MM LT: Type: IMPLANTABLE DEVICE | Site: KNEE | Status: FUNCTIONAL

## 2022-08-10 RX ORDER — PROMETHAZINE HYDROCHLORIDE 25 MG/1
25 SUPPOSITORY RECTAL ONCE AS NEEDED
Status: DISCONTINUED | OUTPATIENT
Start: 2022-08-10 | End: 2022-08-10 | Stop reason: HOSPADM

## 2022-08-10 RX ORDER — DEXAMETHASONE SODIUM PHOSPHATE 4 MG/ML
INJECTION, SOLUTION INTRA-ARTICULAR; INTRALESIONAL; INTRAMUSCULAR; INTRAVENOUS; SOFT TISSUE AS NEEDED
Status: DISCONTINUED | OUTPATIENT
Start: 2022-08-10 | End: 2022-08-10 | Stop reason: SURG

## 2022-08-10 RX ORDER — OXYCODONE HYDROCHLORIDE 5 MG/1
5 TABLET ORAL
Status: DISCONTINUED | OUTPATIENT
Start: 2022-08-10 | End: 2022-08-10 | Stop reason: HOSPADM

## 2022-08-10 RX ORDER — EPHEDRINE SULFATE 50 MG/ML
INJECTION, SOLUTION INTRAVENOUS AS NEEDED
Status: DISCONTINUED | OUTPATIENT
Start: 2022-08-10 | End: 2022-08-10 | Stop reason: SURG

## 2022-08-10 RX ORDER — ROCURONIUM BROMIDE 10 MG/ML
INJECTION, SOLUTION INTRAVENOUS AS NEEDED
Status: DISCONTINUED | OUTPATIENT
Start: 2022-08-10 | End: 2022-08-10 | Stop reason: SURG

## 2022-08-10 RX ORDER — ONDANSETRON 2 MG/ML
4 INJECTION INTRAMUSCULAR; INTRAVENOUS EVERY 6 HOURS PRN
Status: DISCONTINUED | OUTPATIENT
Start: 2022-08-10 | End: 2022-08-12 | Stop reason: HOSPADM

## 2022-08-10 RX ORDER — ACETAMINOPHEN 500 MG
1000 TABLET ORAL EVERY 8 HOURS
Status: DISPENSED | OUTPATIENT
Start: 2022-08-10 | End: 2022-08-12

## 2022-08-10 RX ORDER — LIDOCAINE HYDROCHLORIDE 20 MG/ML
INJECTION, SOLUTION EPIDURAL; INFILTRATION; INTRACAUDAL; PERINEURAL AS NEEDED
Status: DISCONTINUED | OUTPATIENT
Start: 2022-08-10 | End: 2022-08-10 | Stop reason: SURG

## 2022-08-10 RX ORDER — BUPIVACAINE HYDROCHLORIDE AND EPINEPHRINE 5; 5 MG/ML; UG/ML
INJECTION, SOLUTION EPIDURAL; INTRACAUDAL; PERINEURAL
Status: COMPLETED | OUTPATIENT
Start: 2022-08-10 | End: 2022-08-10

## 2022-08-10 RX ORDER — GABAPENTIN 300 MG/1
300 CAPSULE ORAL ONCE
Status: COMPLETED | OUTPATIENT
Start: 2022-08-10 | End: 2022-08-10

## 2022-08-10 RX ORDER — HYDROCODONE BITARTRATE AND ACETAMINOPHEN 7.5; 325 MG/1; MG/1
2 TABLET ORAL EVERY 4 HOURS PRN
Status: DISCONTINUED | OUTPATIENT
Start: 2022-08-10 | End: 2022-08-12 | Stop reason: HOSPADM

## 2022-08-10 RX ORDER — CELECOXIB 100 MG/1
200 CAPSULE ORAL ONCE
Status: COMPLETED | OUTPATIENT
Start: 2022-08-10 | End: 2022-08-10

## 2022-08-10 RX ORDER — PROPOFOL 10 MG/ML
VIAL (ML) INTRAVENOUS AS NEEDED
Status: DISCONTINUED | OUTPATIENT
Start: 2022-08-10 | End: 2022-08-10 | Stop reason: SURG

## 2022-08-10 RX ORDER — TRANEXAMIC ACID 10 MG/ML
2000 INJECTION, SOLUTION INTRAVENOUS ONCE
Status: DISCONTINUED | OUTPATIENT
Start: 2022-08-10 | End: 2022-08-10 | Stop reason: CLARIF

## 2022-08-10 RX ORDER — CEFAZOLIN SODIUM IN 0.9 % NACL 3 G/100 ML
3 INTRAVENOUS SOLUTION, PIGGYBACK (ML) INTRAVENOUS EVERY 8 HOURS
Status: COMPLETED | OUTPATIENT
Start: 2022-08-10 | End: 2022-08-11

## 2022-08-10 RX ORDER — NALOXONE HCL 0.4 MG/ML
0.4 VIAL (ML) INJECTION
Status: DISCONTINUED | OUTPATIENT
Start: 2022-08-10 | End: 2022-08-12 | Stop reason: HOSPADM

## 2022-08-10 RX ORDER — GLYCOPYRROLATE 0.2 MG/ML
0.2 INJECTION INTRAMUSCULAR; INTRAVENOUS
Status: COMPLETED | OUTPATIENT
Start: 2022-08-10 | End: 2022-08-10

## 2022-08-10 RX ORDER — HYDROCODONE BITARTRATE AND ACETAMINOPHEN 7.5; 325 MG/1; MG/1
1 TABLET ORAL EVERY 4 HOURS PRN
Status: DISCONTINUED | OUTPATIENT
Start: 2022-08-10 | End: 2022-08-12 | Stop reason: HOSPADM

## 2022-08-10 RX ORDER — ACETAMINOPHEN 500 MG
1000 TABLET ORAL ONCE
Status: COMPLETED | OUTPATIENT
Start: 2022-08-10 | End: 2022-08-10

## 2022-08-10 RX ORDER — FENTANYL CITRATE 50 UG/ML
INJECTION, SOLUTION INTRAMUSCULAR; INTRAVENOUS AS NEEDED
Status: DISCONTINUED | OUTPATIENT
Start: 2022-08-10 | End: 2022-08-10 | Stop reason: SURG

## 2022-08-10 RX ORDER — ONDANSETRON 2 MG/ML
INJECTION INTRAMUSCULAR; INTRAVENOUS AS NEEDED
Status: DISCONTINUED | OUTPATIENT
Start: 2022-08-10 | End: 2022-08-10 | Stop reason: SURG

## 2022-08-10 RX ORDER — PHENYLEPHRINE HCL IN 0.9% NACL 1 MG/10 ML
SYRINGE (ML) INTRAVENOUS AS NEEDED
Status: DISCONTINUED | OUTPATIENT
Start: 2022-08-10 | End: 2022-08-10 | Stop reason: SURG

## 2022-08-10 RX ORDER — KETOROLAC TROMETHAMINE 15 MG/ML
15 INJECTION, SOLUTION INTRAMUSCULAR; INTRAVENOUS EVERY 6 HOURS
Status: DISCONTINUED | OUTPATIENT
Start: 2022-08-10 | End: 2022-08-10

## 2022-08-10 RX ORDER — SODIUM CHLORIDE 9 MG/ML
100 INJECTION, SOLUTION INTRAVENOUS CONTINUOUS
Status: DISCONTINUED | OUTPATIENT
Start: 2022-08-10 | End: 2022-08-12 | Stop reason: HOSPADM

## 2022-08-10 RX ORDER — DEXMEDETOMIDINE HYDROCHLORIDE 100 UG/ML
INJECTION, SOLUTION INTRAVENOUS AS NEEDED
Status: DISCONTINUED | OUTPATIENT
Start: 2022-08-10 | End: 2022-08-10 | Stop reason: SURG

## 2022-08-10 RX ORDER — MEPERIDINE HYDROCHLORIDE 25 MG/ML
12.5 INJECTION INTRAMUSCULAR; INTRAVENOUS; SUBCUTANEOUS
Status: DISCONTINUED | OUTPATIENT
Start: 2022-08-10 | End: 2022-08-10 | Stop reason: HOSPADM

## 2022-08-10 RX ORDER — PROMETHAZINE HYDROCHLORIDE 12.5 MG/1
25 TABLET ORAL ONCE AS NEEDED
Status: DISCONTINUED | OUTPATIENT
Start: 2022-08-10 | End: 2022-08-10 | Stop reason: HOSPADM

## 2022-08-10 RX ORDER — MIDAZOLAM HYDROCHLORIDE 1 MG/ML
2 INJECTION INTRAMUSCULAR; INTRAVENOUS ONCE
Status: DISCONTINUED | OUTPATIENT
Start: 2022-08-10 | End: 2022-08-10 | Stop reason: HOSPADM

## 2022-08-10 RX ORDER — CEFAZOLIN SODIUM IN 0.9 % NACL 3 G/100 ML
3 INTRAVENOUS SOLUTION, PIGGYBACK (ML) INTRAVENOUS ONCE
Status: COMPLETED | OUTPATIENT
Start: 2022-08-10 | End: 2022-08-10

## 2022-08-10 RX ORDER — ONDANSETRON 4 MG/1
4 TABLET, FILM COATED ORAL EVERY 6 HOURS PRN
Status: DISCONTINUED | OUTPATIENT
Start: 2022-08-10 | End: 2022-08-12 | Stop reason: HOSPADM

## 2022-08-10 RX ORDER — MAGNESIUM HYDROXIDE 1200 MG/15ML
LIQUID ORAL AS NEEDED
Status: DISCONTINUED | OUTPATIENT
Start: 2022-08-10 | End: 2022-08-10 | Stop reason: HOSPADM

## 2022-08-10 RX ORDER — ONDANSETRON 2 MG/ML
4 INJECTION INTRAMUSCULAR; INTRAVENOUS ONCE AS NEEDED
Status: DISCONTINUED | OUTPATIENT
Start: 2022-08-10 | End: 2022-08-10 | Stop reason: HOSPADM

## 2022-08-10 RX ORDER — POVIDONE-IODINE 10 MG/ML
SOLUTION TOPICAL ONCE
Status: COMPLETED | OUTPATIENT
Start: 2022-08-10 | End: 2022-08-10

## 2022-08-10 RX ORDER — SODIUM CHLORIDE, SODIUM LACTATE, POTASSIUM CHLORIDE, CALCIUM CHLORIDE 600; 310; 30; 20 MG/100ML; MG/100ML; MG/100ML; MG/100ML
9 INJECTION, SOLUTION INTRAVENOUS CONTINUOUS PRN
Status: DISCONTINUED | OUTPATIENT
Start: 2022-08-10 | End: 2022-08-12 | Stop reason: HOSPADM

## 2022-08-10 RX ORDER — CEFAZOLIN SODIUM 2 G/100ML
2 INJECTION, SOLUTION INTRAVENOUS ONCE
Status: DISCONTINUED | OUTPATIENT
Start: 2022-08-10 | End: 2022-08-10 | Stop reason: DRUGHIGH

## 2022-08-10 RX ADMIN — LIDOCAINE HYDROCHLORIDE 50 MG: 20 INJECTION, SOLUTION EPIDURAL; INFILTRATION; INTRACAUDAL; PERINEURAL at 09:05

## 2022-08-10 RX ADMIN — GLYCOPYRROLATE 0.2 MG: 0.2 INJECTION INTRAMUSCULAR; INTRAVENOUS at 06:37

## 2022-08-10 RX ADMIN — Medication 50 MCG: at 08:05

## 2022-08-10 RX ADMIN — ONDANSETRON 4 MG: 2 INJECTION INTRAMUSCULAR; INTRAVENOUS at 09:01

## 2022-08-10 RX ADMIN — POVIDONE-IODINE: 10 SOLUTION TOPICAL at 06:22

## 2022-08-10 RX ADMIN — PROPOFOL 50 MG: 10 INJECTION, EMULSION INTRAVENOUS at 07:26

## 2022-08-10 RX ADMIN — ACETAMINOPHEN 1000 MG: 500 TABLET ORAL at 06:36

## 2022-08-10 RX ADMIN — Medication 100 MCG: at 09:00

## 2022-08-10 RX ADMIN — PROPOFOL 150 MG: 10 INJECTION, EMULSION INTRAVENOUS at 07:12

## 2022-08-10 RX ADMIN — FENTANYL CITRATE 25 MCG: 50 INJECTION, SOLUTION INTRAMUSCULAR; INTRAVENOUS at 08:15

## 2022-08-10 RX ADMIN — Medication 50 MCG: at 09:03

## 2022-08-10 RX ADMIN — ACETAMINOPHEN 1000 MG: 500 TABLET ORAL at 20:41

## 2022-08-10 RX ADMIN — EPHEDRINE SULFATE 10 MG: 50 INJECTION INTRAVENOUS at 07:39

## 2022-08-10 RX ADMIN — Medication 100 MCG: at 07:50

## 2022-08-10 RX ADMIN — SODIUM CHLORIDE 100 ML/HR: 9 INJECTION, SOLUTION INTRAVENOUS at 12:54

## 2022-08-10 RX ADMIN — Medication 50 MCG: at 08:44

## 2022-08-10 RX ADMIN — ROCURONIUM BROMIDE 50 MG: 10 INJECTION INTRAVENOUS at 07:13

## 2022-08-10 RX ADMIN — ROCURONIUM BROMIDE 15 MG: 10 INJECTION INTRAVENOUS at 08:33

## 2022-08-10 RX ADMIN — SODIUM CHLORIDE, POTASSIUM CHLORIDE, SODIUM LACTATE AND CALCIUM CHLORIDE 9 ML/HR: 600; 310; 30; 20 INJECTION, SOLUTION INTRAVENOUS at 06:38

## 2022-08-10 RX ADMIN — BUPIVACAINE HYDROCHLORIDE AND EPINEPHRINE BITARTRATE 40 ML: 5; .005 INJECTION, SOLUTION EPIDURAL; INTRACAUDAL; PERINEURAL at 06:53

## 2022-08-10 RX ADMIN — DEXAMETHASONE SODIUM PHOSPHATE 8 MG: 4 INJECTION, SOLUTION INTRA-ARTICULAR; INTRALESIONAL; INTRAMUSCULAR; INTRAVENOUS; SOFT TISSUE at 07:24

## 2022-08-10 RX ADMIN — Medication 50 MCG: at 08:58

## 2022-08-10 RX ADMIN — EPHEDRINE SULFATE 10 MG: 50 INJECTION INTRAVENOUS at 09:02

## 2022-08-10 RX ADMIN — EPHEDRINE SULFATE 10 MG: 50 INJECTION INTRAVENOUS at 07:30

## 2022-08-10 RX ADMIN — GABAPENTIN 300 MG: 300 CAPSULE ORAL at 06:37

## 2022-08-10 RX ADMIN — FENTANYL CITRATE 25 MCG: 50 INJECTION, SOLUTION INTRAMUSCULAR; INTRAVENOUS at 07:43

## 2022-08-10 RX ADMIN — FENTANYL CITRATE 25 MCG: 50 INJECTION, SOLUTION INTRAMUSCULAR; INTRAVENOUS at 07:52

## 2022-08-10 RX ADMIN — Medication 100 MCG: at 08:40

## 2022-08-10 RX ADMIN — LIDOCAINE HYDROCHLORIDE 50 MG: 20 INJECTION, SOLUTION EPIDURAL; INFILTRATION; INTRACAUDAL; PERINEURAL at 07:12

## 2022-08-10 RX ADMIN — DEXMEDETOMIDINE HYDROCHLORIDE 10 MCG: 100 INJECTION, SOLUTION, CONCENTRATE INTRAVENOUS at 08:18

## 2022-08-10 RX ADMIN — Medication 3 G: at 07:17

## 2022-08-10 RX ADMIN — CELECOXIB 200 MG: 100 CAPSULE ORAL at 06:37

## 2022-08-10 RX ADMIN — SUGAMMADEX 200 MG: 100 INJECTION, SOLUTION INTRAVENOUS at 09:04

## 2022-08-10 RX ADMIN — CEFAZOLIN 3 G: 10 INJECTION, POWDER, FOR SOLUTION INTRAVENOUS; PARENTERAL at 15:00

## 2022-08-10 RX ADMIN — ROCURONIUM BROMIDE 25 MG: 10 INJECTION INTRAVENOUS at 07:54

## 2022-08-10 RX ADMIN — FENTANYL CITRATE 50 MCG: 50 INJECTION, SOLUTION INTRAMUSCULAR; INTRAVENOUS at 07:12

## 2022-08-10 RX ADMIN — KETOROLAC TROMETHAMINE 15 MG: 15 INJECTION, SOLUTION INTRAMUSCULAR; INTRAVENOUS at 12:53

## 2022-08-10 RX ADMIN — ACETAMINOPHEN 1000 MG: 500 TABLET ORAL at 12:54

## 2022-08-10 NOTE — PLAN OF CARE
Goal Outcome Evaluation:  Plan of Care Reviewed With: patient           Outcome Evaluation: Patient presents with decreased strength, transfers and ambulation.  Skilled physical therapy services will be required to address these mobility deficits.  Recommend outpt PT services upon discharge from the hospital

## 2022-08-10 NOTE — H&P
[facility]   HOSPITALIST HISTORY AND PHYSICAL  Date: 8/10/2022   Patient Name: Vahid Bass  : 1960  MRN: [id number]  Primary Care Physician:  Rodolfo Rao APRN  Date of admission: 8/10/2022    Subjective   Subjective     Chief Complaint: Consult for management of chronic medical comorbidities including hyperlipidemia, MARIANNE, BPH    HPI:    Vahid Bass is a 62 y.o. male with osteoarthritis of the left knee and persistent left knee pain that failed conservative management.  Underwent elective total left knee replacement on 8/10/2020 without complication.  Hospital service on board for management of chronic comorbidities including hyperlipidemia, BPH, MARIANNE.  Patient doing well postop, no acute issues.  On 3 L of oxygen with SpO2 96%. Hemodynamically stable.  Denies chest pain, shortness of air.  Tolerated liquids.  No nausea vomiting or abdominal pain.  Denies uncontrolled pain in the left hip/lower extremity.  No acute complaints.      Personal History     Past Medical History:  Osteoarthritis  Benign prostatic hyperplasia  Hyperlipidemia  MARIANNE      Past Surgical History:  Past Surgical History:   Procedure Laterality Date   • RHINOPLASTY     • TONSILLECTOMY       Family History:   Family History   Problem Relation Age of Onset   • Cancer Mother         ovarian   • Stroke Mother    • Ovarian cancer Mother    • Diabetes Father    • Heart failure Sister    • Heart disease Brother    • Malig Hyperthermia Neg Hx          Social History:   Past Surgical History:   Procedure Laterality Date   • RHINOPLASTY     • TONSILLECTOMY         Home Medications:  Cyanocobalamin, Vitamin D-3, aspirin, and tamsulosin    Allergies:  No Known Allergies    Review of Systems   All systems were reviewed and negative except for: Fatigue, nausea, left knee numbness    Objective   Objective     Vitals:   Temp:  [96.5 °F (35.8 °C)-97.9 °F (36.6 °C)] 97.9 °F (36.6 °C)  Heart Rate:  [46-83] 56  Resp:  [12-20]  13  BP: ()/(47-83) 100/56  Flow (L/min):  [3] 3    Physical Exam    Constitutional: Up in chair, awake, conversant, NAD   Eyes: Pupils equal, sclerae anicteric, no conjunctival injection   HENT: NCAT, mucous membranes moist   Neck: Supple, no thyromegaly, no lymphadenopathy, trachea midline   Respiratory: Clear to auscultation bilaterally, nonlabored respirations    Cardiovascular: RRR, no murmurs, rubs, or gallops, palpable pedal pulses bilaterally   Gastrointestinal: Positive bowel sounds, soft, nontender, nondistended   Musculoskeletal: No bilateral ankle edema, no clubbing or cyanosis to extremities   Neurologic: Aert, Cranial Nerves grossly intact to confrontation, speech clear   Skin: No rashes     Result Review    Result Review:  I have personally reviewed the results from the time of this admission to 8/10/2022 16:31 EDT and agree with these findings:  []  Laboratory  []  Microbiology  [x]  Radiology  XR Knee 1 or 2 View Left    Result Date: 8/10/2022  PROCEDURE: XR KNEE 1 OR 2 VW LEFT  COMPARISON: [facility], XR KNEE 3 VW BILATERAL, 5/13/2022, 8:57.  INDICATIONS: Post-Op Knee Arthoplasty  FINDINGS:  Images obtained following left knee arthroplasty.  Good alignment.  No fracture or destructive bone lesion.  Expected postoperative changes noted in the soft tissues.  Skin staples present       Status post left knee arthroplasty with no evidence of complication      VALDEMAR MAS MD       Electronically Signed and Approved By: VALDEMAR MAS MD on 8/10/2022 at 10:03             Peripheral Block    Result Date: 8/10/2022  Bolivar Coulter MD     8/10/2022  7:02 AM Peripheral Block Pre-sedation assessment completed: 8/10/2022 6:49 AM Patient reassessed immediately prior to procedure Patient location during procedure: pre-op Start time: 8/10/2022 6:49 AM Stop time: 8/10/2022 6:53 AM Reason for block: at surgeon's request and post-op pain management Performed by Anesthesiologist: Bolivar Coulter MD  Preanesthetic Checklist Completed: patient identified, IV checked, site marked, risks and benefits discussed, surgical consent, monitors and equipment checked, pre-op evaluation and timeout performed Prep: Pt Position: supine Sterile barriers:alcohol skin prep, partial drape, cap, washed/disinfected hands, mask and gloves Prep: ChloraPrep Patient monitoring: blood pressure monitoring, continuous pulse oximetry and EKG Procedure Sedation: yes Performed under: local infiltration Guidance:ultrasound guided and nerve stimulator ULTRASOUND INTERPRETATION. Using ultrasound guidance a 20 G gauge needle was placed in close proximity to the nerve, at which point, under ultrasound guidance anesthetic was injected in the area of the nerve and spread of the anesthesia was seen on ultrasound in close proximity thereto.  There were no abnormalities seen on ultrasound; a digital image was taken; and the patient tolerated the procedure with no complications. Images:still images obtained, printed/placed on chart Block Type:adductor canal block Injection Technique:single-shot Needle Type:echogenic Needle Gauge:20 G (4in) Resistance on Injection: none Medications Used: bupivacaine-EPINEPHrine PF (MARCAINE w/EPI) 0.5% -1:481725 injection, 40 mL Med administered at 8/10/2022 6:53 AM Medications Comment:precedex 50mcg added to above solution Post Assessment Injection Assessment: negative aspiration for heme, no paresthesia on injection and incremental injection Patient Tolerance:comfortable throughout block Complications:no Additional Notes The block or continuous infusion is requested by the referring physician for management of postoperative pain, or pain related to a procedure. Ultrasound guidance (deemed medically necessary). Painless injection, pt was awake and conversant during the procedure without complications. Needle and surrounding structures visualized throughout procedure. No adverse reactions or complications seen during  this period. Post-procedure image showed no signs of complication, and anatomy was consistent with an uncomplicated nerve blockade.     XR foot 3+ views right    Result Date: 7/14/2022  Standing x-rays of the left foot show a lateral talar first metatarsal angle of -48 degrees, calcaneal pitch 8 degrees, medial cuneiform height -12 mm, talonavicular uncoverage 60%    XR ankle 3+ views right    Result Date: 7/14/2022  Standing x-rays of the right ankle show 7 degrees of tibiotalar valgus.   There is a positive empty talar head sign.    []  EKG/Telemetry   []  Cardiology/Vascular   []  Pathology  []  Old records  []  Other:      Assessment & Plan   Assessment / Plan     Assessment/Plan:   Active Hospital Problems    Diagnosis  POA   • **S/P total knee replacement, left [Z96.652]  Not Applicable   • Osteoarthritis of left knee, unspecified osteoarthritis type [M17.12]  Yes   • BPH (benign prostatic hyperplasia) [N40.0]  Yes   • MARIANNE (obstructive sleep apnea) [G47.33]  Unknown         Postop day 0 left knee replacement  Complete periprocedural IV cefazolin  continue care with PO/IV narcotics, fluids,  antiemetics, bowel regimen per orders  Wean supplemental oxygen to maintain O2 saturation greater than 90%  Encourage incentive spirometry  Okay to use home MARIANNE machine  Resume home Flomax 0.4 mg daily  Home aspirin on hold  Eliquis 2.5 mg twice daily for DVT prophylax  PT evaluation  H/H, BMP in AM       DVT prophylaxis:  Medical and mechanical DVT prophylaxis orders are present.      Admission Status:  I believe this patient meets OUTPATIENT status.    Electronically signed by Abimael Bautista DO, 08/10/22, 4:31 PM EDT.

## 2022-08-10 NOTE — ANESTHESIA PREPROCEDURE EVALUATION
Anesthesia Evaluation     Patient summary reviewed and Nursing notes reviewed   no history of anesthetic complications:  NPO Solid Status: > 8 hours  NPO Liquid Status: > 2 hours           Airway   Mallampati: II  TM distance: >3 FB  Neck ROM: full  No difficulty expected  Dental      Pulmonary - normal exam    breath sounds clear to auscultation  (+) sleep apnea,   Cardiovascular - normal exam  Exercise tolerance: good (4-7 METS)    Rhythm: regular  Rate: normal    (+) DVT, hyperlipidemia,       Neuro/Psych- negative ROS  GI/Hepatic/Renal/Endo - negative ROS     Musculoskeletal     Abdominal    Substance History - negative use     OB/GYN negative ob/gyn ROS         Other   arthritis,                      Anesthesia Plan    ASA 3     general with block     (Pt prefers general over spinal)    Anesthetic plan, risks, benefits, and alternatives have been provided, discussed and informed consent has been obtained with: patient and spouse/significant other.        CODE STATUS:

## 2022-08-10 NOTE — ANESTHESIA PROCEDURE NOTES
Peripheral Block    Pre-sedation assessment completed: 8/10/2022 6:49 AM    Patient reassessed immediately prior to procedure    Patient location during procedure: pre-op  Start time: 8/10/2022 6:49 AM  Stop time: 8/10/2022 6:53 AM  Reason for block: at surgeon's request and post-op pain management  Performed by  Anesthesiologist: Bolivar Coulter MD  Preanesthetic Checklist  Completed: patient identified, IV checked, site marked, risks and benefits discussed, surgical consent, monitors and equipment checked, pre-op evaluation and timeout performed  Prep:  Pt Position: supine  Sterile barriers:alcohol skin prep, partial drape, cap, washed/disinfected hands, mask and gloves  Prep: ChloraPrep  Patient monitoring: blood pressure monitoring, continuous pulse oximetry and EKG  Procedure    Sedation: yes  Performed under: local infiltration  Guidance:ultrasound guided and nerve stimulator    ULTRASOUND INTERPRETATION. Using ultrasound guidance a 20 G gauge needle was placed in close proximity to the nerve, at which point, under ultrasound guidance anesthetic was injected in the area of the nerve and spread of the anesthesia was seen on ultrasound in close proximity thereto.  There were no abnormalities seen on ultrasound; a digital image was taken; and the patient tolerated the procedure with no complications. Images:still images obtained, printed/placed on chart    Block Type:adductor canal block  Injection Technique:single-shot  Needle Type:echogenic  Needle Gauge:20 G (4in)  Resistance on Injection: none    Medications Used: bupivacaine-EPINEPHrine PF (MARCAINE w/EPI) 0.5% -1:[id number] injection, 40 mL  Med administered at 8/10/2022 6:53 AM      Medications  Comment:precedex 50mcg added to above solution    Post Assessment  Injection Assessment: negative aspiration for heme, no paresthesia on injection and incremental injection  Patient Tolerance:comfortable throughout block  Complications:no  Additional Notes  The block or  continuous infusion is requested by the referring physician for management of postoperative pain, or pain related to a procedure. Ultrasound guidance (deemed medically necessary). Painless injection, pt was awake and conversant during the procedure without complications. Needle and surrounding structures visualized throughout procedure. No adverse reactions or complications seen during this period. Post-procedure image showed no signs of complication, and anatomy was consistent with an uncomplicated nerve blockade.

## 2022-08-10 NOTE — PLAN OF CARE
Goal Outcome Evaluation:      Pt S/P Left Total Knee Today. Pt has been stable since arrival, vitals have been WNL. Dressing dry and intact to left knee. Pt on ETCO2 monitor, has been WNL. Pain has been well controlled with oral pain medications, pt has voided without difficulty.

## 2022-08-10 NOTE — THERAPY EVALUATION
Acute Care - Physical Therapy Initial Evaluation   Chris     Patient Name: Vahid Bass  : 1960  MRN: [id number]  Today's Date: 8/10/2022     Admit date: 8/10/2022     Referring Physician: Abimael Bautista DO     Surgery Date:8/10/2022   Procedure(s) (LRB):  LEFT TOTAL KNEE ARTHROPLASTY WITH NETTE ROBOT (Left)            Visit Dx:     ICD-10-CM ICD-9-CM   1. Difficulty in walking  R26.2 719.7   2. Primary osteoarthritis of knees, bilateral  M17.0 715.16     Patient Active Problem List   Diagnosis   • Screening for colon cancer   • Anemia   • Arthritis   • Hyperlipidemia   • Other acute sinusitis   • Class 3 severe obesity due to excess calories with serious comorbidity and body mass index (BMI) of 40.0 to 44.9 in adult ([facility])   • Primary osteoarthritis of knees, bilateral   • Osteoarthritis of left knee   • Osteoarthritis of left knee, unspecified osteoarthritis type     Past Medical History:   Diagnosis Date   • Allergies    • Anemia     NO CURRENT S/S   • Arthritis    • BPH (benign prostatic hyperplasia)    • DVT (deep venous thrombosis) ([facility])      LOWER LEG, RESOLVED   • Foot pain, right    • Hyperlipidemia    • Positive skin test for tuberculosis     or positive PPD; INH x 9 months     Past Surgical History:   Procedure Laterality Date   • RHINOPLASTY     • TONSILLECTOMY       PT Assessment (last 12 hours)     PT Evaluation and Treatment     Row Name 08/10/22 1300          Physical Therapy Time and Intention    Subjective Information no complaints  -BOUBACAR     Document Type evaluation  -BOUBACAR     Mode of Treatment individual therapy;physical therapy  -BOUBACAR     Patient Effort good  -BOUBACAR     Row Name 08/10/22 1300          General Information    Patient Observations alert;cooperative;agree to therapy  -BOUBACAR     Prior Level of Function independent:;all household mobility;community mobility  -BOUBACAR     Equipment Currently Used at Home none  -BOUBACAR     Existing Precautions/Restrictions fall;weight bearing  -BOUBACAR      Barriers to Rehab none identified  -BOUBACAR     Row Name 08/10/22 1300          Living Environment    Current Living Arrangements home  -BOUBACAR     People in Home spouse  -BOUBACAR     Row Name 08/10/22 1300          Range of Motion (ROM)    Range of Motion left lower extremity  5-100° knee  -BOUBACAR     Row Name 08/10/22 1300          Strength (Manual Muscle Testing)    Strength (Manual Muscle Testing) left lower extremity  hip flexion 3/5 and knee extension 2-/5  -BOUBACAR     Row Name 08/10/22 1300          Mobility    Extremity Weight-bearing Status left lower extremity  -BOUBACAR     Left Lower Extremity (Weight-bearing Status) weight-bearing as tolerated (WBAT)  -BOUBACAR     Row Name 08/10/22 1300          Bed Mobility    Bed Mobility bed mobility (all) activities;supine-sit  -BOUBACAR     All Activities, [place] (Bed Mobility) moderate assist (50% patient effort)  -BOUBACAR     Supine-Sit [place] (Bed Mobility) moderate assist (50% patient effort)  -BOUBACAR     Row Name 08/10/22 1300          Transfers    Transfers bed-chair transfer;sit-stand transfer  -BOUBACAR     Bed-Chair [place] (Transfers) moderate assist (50% patient effort)  -BOUBACAR     Assistive Device (Bed-Chair Transfers) walker, front-wheeled  -BOUBACAR     Sit-Stand [place] (Transfers) moderate assist (50% patient effort)  -BOUBACAR     Row Name 08/10/22 1300          Sit-Stand Transfer    Assistive Device (Sit-Stand Transfers) walker, front-wheeled  -BOUBACAR     Row Name 08/10/22 1300          Gait/Stairs (Locomotion)    Gait/Stairs Locomotion gait/ambulation assistive device  -BOUBACAR     [place] Level (Gait) moderate assist (50% patient effort)  -BOUBACAR     Assistive Device (Gait) walker, front-wheeled  -BOUBACAR     Ambulated day of surgery or within 4 hours of PACU discharge yes  -BOUBACAR     Distance in Feet (Gait) 15  -BOUBACAR     Pattern (Gait) step-to  knee buckling severely with WB  -BOUBACAR     Row Name 08/10/22 1300          Safety Issues, Functional Mobility    Impairments Affecting Function (Mobility)  balance;endurance/activity tolerance;pain;range of motion (ROM);strength  -BOUBACAR     Row Name 08/10/22 1300          Balance    Balance Assessment standing dynamic balance  -BOUBACAR     Dynamic Standing Balance moderate assist  -BOUBACAR     Position/Device Used, Standing Balance walker, front-wheeled  -BOUBACAR     Row Name             Wound 08/10/22 0748 Left anterior knee Incision    Wound - Properties Group Placement Date: 08/10/22  -SC Placement Time: 0748  -SC Present on Hospital Admission: N  -SC Side: Left  -SC Orientation: anterior  -SC Location: knee  -SC Primary Wound Type: Incision  -SC     Retired Wound - Properties Group Placement Date: 08/10/22  -SC Placement Time: 0748  -SC Present on Hospital Admission: N  -SC Side: Left  -SC Orientation: anterior  -SC Location: knee  -SC Primary Wound Type: Incision  -SC     Retired Wound - Properties Group Date first assessed: 08/10/22  -SC Time first assessed: 0748  -SC Present on Hospital Admission: N  -SC Side: Left  -SC Location: knee  -SC Primary Wound Type: Incision  -SC     Row Name 08/10/22 1300          Plan of Care Review    Plan of Care Reviewed With patient  -BOUBACAR     Outcome Evaluation Patient presents with decreased strength, transfers and ambulation.  Skilled physical therapy services will be required to address these mobility deficits.  Recommend outpt PT services upon discharge from the hospital  -BOBUACAR     Row Name 08/10/22 1300          Therapy Assessment/Plan (PT)    Patient/Family Therapy Goals Statement (PT) Patient wants to recover and get his other knee done  -BOUBACAR     Rehab Potential (PT) good, to achieve stated therapy goals  -BOUBACAR     Criteria for Skilled Interventions Met (PT) skilled treatment is necessary  -BOUBACAR     Therapy Frequency (PT) 2 times/day  -BOUBACAR     Predicted Duration of Therapy Intervention (PT) 10 days  -BOUBACAR     Problem List (PT) problems related to;balance;mobility;strength;pain;range of motion (ROM)  -BOUBACAR     Activity Limitations Related to Problem List  (PT) unable to transfer safely;unable to ambulate safely  -BOUBACAR     Row Name 08/10/22 1300          PT Evaluation Complexity    History, PT Evaluation Complexity no personal factors and/or comorbidities  -BOUBACAR     Examination of Body Systems (PT Eval Complexity) total of 4 or more elements  -BOUBACAR     Clinical Presentation (PT Evaluation Complexity) stable  -BOUBACAR     Clinical Decision Making (PT Evaluation Complexity) low complexity  -BOUBACAR     Overall Complexity (PT Evaluation Complexity) low complexity  -BOUBACAR     Row Name 08/10/22 1300          Therapy Plan Review/Discharge Plan (PT)    Therapy Plan Review (PT) evaluation/treatment results reviewed;participants voiced agreement with care plan;participants included;patient  -BOUBACAR     Row Name 08/10/22 1300          Physical Therapy Goals    Transfer Goal Selection (PT) transfer, PT goal 1  -BOUBACAR     Gait Training Goal Selection (PT) gait training, PT goal 1  -BOUBACAR     Row Name 08/10/22 1300          Transfer Goal 1 (PT)    Activity/Assistive Device (Transfer Goal 1, PT) transfers, all  -BOUBACAR     [place] Level/Cues Needed (Transfer Goal 1, PT) independent  -BOUBACAR     Time Frame (Transfer Goal 1, PT) long term goal (LTG);10 days  -BOUBACAR     Row Name 08/10/22 1300          Gait Training Goal 1 (PT)    Activity/Assistive Device (Gait Training Goal 1, PT) gait (walking locomotion);assistive device use;walker, rolling  -BOUBACAR     [place] Level (Gait Training Goal 1, PT) independent  -BOUBACAR     Distance (Gait Training Goal 1, PT) 300  -BOUBACAR     Time Frame (Gait Training Goal 1, PT) long term goal (LTG);10 days  -BOUBACAR           User Key  (r) = Recorded By, (t) = Taken By, (c) = Cosigned By    Initials Name Provider Type    Elana Hussein, RN Registered Nurse    Umair Oconnell, PT Physical Therapist                Physical Therapy Education                 Title: PT OT SLP Therapies (Done)     Topic: Physical Therapy (Done)     Point: Mobility training (Done)     Learning Progress Summary            Patient Acceptance, E,TB, VU by BOUBACAR at 8/10/2022 1354                   Point: Precautions (Done)     Learning Progress Summary           Patient Acceptance, E,TB, VU by BOUBACAR at 8/10/2022 1354                               User Key     Initials Effective Dates Name Provider Type Discipline    BOUBACAR 06/03/21 -  Umair Zhang PT Physical Therapist PT              PT Recommendation and Plan  Anticipated Discharge Disposition (PT): home with outpatient therapy services  Planned Therapy Interventions (PT): balance training, bed mobility training, gait training, home exercise program, stair training, strengthening, transfer training  Therapy Frequency (PT): 2 times/day  Plan of Care Reviewed With: patient  Outcome Evaluation: Patient presents with decreased strength, transfers and ambulation.  Skilled physical therapy services will be required to address these mobility deficits.  Recommend outpt PT services upon discharge from the hospital   Outcome Measures     Row Name 08/10/22 1300             How much help from another person do you currently need...    Turning from your back to your side while in flat bed without using bedrails? 2  -BOUBACAR      Moving from lying on back to sitting on the side of a flat bed without bedrails? 2  -BOUBACAR      Moving to and from a bed to a chair (including a wheelchair)? 2  -BOUBACAR      Standing up from a chair using your arms (e.g., wheelchair, bedside chair)? 2  -BOUBACAR      Climbing 3-5 steps with a railing? 2  -BOUBACAR      To walk in hospital room? 2  -BOUBACAR      AM-PAC 6 Clicks Score (PT) 12  -BOUBACAR              Functional Assessment    Outcome Measure Options AM-PAC 6 Clicks Basic Mobility (PT)  -BOUBACAR            User Key  (r) = Recorded By, (t) = Taken By, (c) = Cosigned By    Initials Name Provider Type    Umair Oconnell PT Physical Therapist                 Time Calculation:    PT Charges     Row Name 08/10/22 3065             Time Calculation    PT Received On 08/10/22  -BOUBACAR      PT Goal  Re-Cert Due Date 08/19/22  -BOUBACAR              Untimed Charges    PT Eval/Re-eval Minutes 35  -BOUBACAR              Total Minutes    Untimed Charges Total Minutes 35  -BOUBACAR       Total Minutes 35  -BOUBACAR            User Key  (r) = Recorded By, (t) = Taken By, (c) = Cosigned By    Initials Name Provider Type    Umair Oconnell, PT Physical Therapist              Therapy Charges for Today     Code Description Service Date Service Provider Modifiers Qty    31031772497 HC PT EVAL LOW COMPLEXITY 3 8/10/2022 Umair Zhang, PT GP 1          PT G-Codes  Outcome Measure Options: AM-PAC 6 Clicks Basic Mobility (PT)  AM-PAC 6 Clicks Score (PT): 12    Umair Zhang PT  8/10/2022

## 2022-08-10 NOTE — ANESTHESIA POSTPROCEDURE EVALUATION
Patient: Vahid Bass    Procedure Summary     Date: 08/10/22 Room / Location: [facility] OR 06 / [facility] MAIN OR    Anesthesia Start: 0707 Anesthesia Stop: 0924    Procedure: LEFT TOTAL KNEE ARTHROPLASTY WITH NETTE ROBOT (Left Knee) Diagnosis:       Primary osteoarthritis of knees, bilateral      (Primary osteoarthritis of knees, bilateral [M17.0])    Surgeons: Niles Amezquita MD Provider: Balwinder Thornton MD    Anesthesia Type: general with block ASA Status: 3          Anesthesia Type: general with block    Vitals  Vitals Value Taken Time   /65 08/10/22 1007   Temp 36.1 °C (97 °F) 08/10/22 1005   Pulse 72 08/10/22 1009   Resp 16 08/10/22 1005   SpO2 98 % 08/10/22 1009   Vitals shown include unvalidated device data.        Post Anesthesia Care and Evaluation    Patient location during evaluation: bedside  Patient participation: complete - patient participated  Level of consciousness: awake  Pain management: adequate    Airway patency: patent  Anesthetic complications: No anesthetic complications  PONV Status: none  Cardiovascular status: acceptable and stable  Respiratory status: acceptable  Hydration status: acceptable    Comments: An Anesthesiologist personally participated in the most demanding procedures (including induction and emergence if applicable) in the anesthesia plan, monitored the course of anesthesia administration at frequent intervals and remained physically present and available for immediate diagnosis and treatment of emergencies.

## 2022-08-10 NOTE — H&P
[facility]   HISTORY AND PHYSICAL    Patient Name: Vahid Bass  : 1960  MRN: [id number]  Primary Care Physician:  Rodolfo Rao APRN  Date of admission: (Not on file)    Subjective   Subjective     Chief Complaint: Left knee pain    History of Present Illness: The patient is a 62-year-old male with left knee osteoarthritis.  He reports pain, stiffness, difficulty ambulating.  The pain is worse with activity.  He has failed conservative measures and tried extensive nonoperative treatment.  He wishes to undergo a left total knee replacement.    Review of Systems : Negative except as mentioned in history of present illness    Personal History     Past Medical History:   Diagnosis Date   • Allergies    • Anemia     NO CURRENT S/S   • Arthritis    • BPH (benign prostatic hyperplasia)    • DVT (deep venous thrombosis) ([facility])      LOWER LEG, RESOLVED   • Foot pain, right    • Hyperlipidemia    • Positive skin test for tuberculosis     or positive PPD; INH x 9 months       Past Surgical History:   Procedure Laterality Date   • RHINOPLASTY     • TONSILLECTOMY         Family History: family history includes Cancer in his mother; Diabetes in his father; Heart disease in his brother; Heart failure in his sister; Ovarian cancer in his mother; Stroke in his mother. Otherwise pertinent FHx was reviewed and not pertinent to current issue.    Social History:  reports that he has never smoked. He has never used smokeless tobacco. He reports current alcohol use. He reports that he does not use drugs.    Home Medications:  Cyanocobalamin, Vitamin D-3, aspirin, and tamsulosin    Allergies:  No Known Allergies    Objective    Objective     Vitals:        Physical Exam: General: No apparent distress, alert and oriented x3  HEENT: Normocephalic/atraumatic  Neck: Supple  Cardiovascular: Regular heart rate  Chest: Unlabored breathing  Abdomen: Soft, nontender nondistended  Musculoskeletal: Neurovascular intact to  extremity.  Positive pulses.  Decreased range of motion of the knee.  Stability intact.  Tender to palpation.  Mild swelling.  Neurological: Grossly intact    Result Review    Result Review:  I have personally reviewed the results from the time of this admission to 8/9/2022 20:46 EDT and agree with these findings:  []  Laboratory list / accordion  []  Microbiology  [x]  Radiology  []  EKG/Telemetry   []  Cardiology/Vascular   []  Pathology  []  Old records  []  Other:  Most notable findings include: Advanced degenerative changes of the left knee      Assessment & Plan   Assessment / Plan     Brief Patient Summary:  Vahid Bass is a 62 y.o. male who has left knee osteoarthritis    Active Hospital Problems:  Active Hospital Problems    Diagnosis    • **Primary osteoarthritis of knees, bilateral      Plan:   We discussed treatment options with the patient.  Discussed operative versus nonoperative treatment.  Informed consent obtained and patient wishes to proceed with left total knee replacement.  Risk and benefits of surgery were discussed.    DVT prophylaxis:  No DVT prophylaxis order currently exists.    CODE STATUS:       Admission Status:  I believe this patient meets outpatient status.    Niles Amezquita MD

## 2022-08-10 NOTE — BRIEF OP NOTE
TOTAL KNEE ARTHROPLASTY WITH NETTE ROBOT  Progress Note    Vahid Bass  8/10/2022    Pre-op Diagnosis:   Primary osteoarthritis of knees, bilateral [M17.0]     Left knee osteoarthritis  Post-Op Diagnosis Codes:     * Primary osteoarthritis of knees, bilateral [M17.0]   Left knee osteoarthritis    Procedure/CPT® Codes:        Procedure(s):  LEFT TOTAL KNEE ARTHROPLASTY WITH NETTE ROBOT    Surgeon(s):  Niles Amezquita MD    Anesthesia: General with Block    Staff:   Circulator: Elana Gonzalez RN  Scrub Person: Irvin Chauhan  Assistant: Leslie Trinh; Korey Aponte PA  Other: Courtney Barrera RN  Assistant: Leslie Trinh; Korey Aponte PA      Estimated Blood Loss: 75 mL    Urine Voided: * No values recorded between 8/10/2022  7:06 AM and 8/10/2022  9:19 AM *    Specimens:                None          Drains: * No LDAs found *    Findings: Left knee osteoarthritis        Complications: None    Assistant: Leslie Trinh; Korey Aponte PA  was responsible for performing the following activities: Retraction, Suction, Irrigation and Placing Dressing and their skilled assistance was necessary for the success of this case.    Niles Amezquita MD     Date: 8/10/2022  Time: 09:21 EDT

## 2022-08-11 PROBLEM — Z96.652 S/P TOTAL KNEE REPLACEMENT, LEFT: Status: RESOLVED | Noted: 2022-08-10 | Resolved: 2022-08-11

## 2022-08-11 LAB
HCT VFR BLD AUTO: 36.1 % (ref 37.5–51)
HGB BLD-MCNC: 11.7 G/DL (ref 13–17.7)

## 2022-08-11 PROCEDURE — 85014 HEMATOCRIT: CPT | Performed by: ORTHOPAEDIC SURGERY

## 2022-08-11 PROCEDURE — 97535 SELF CARE MNGMENT TRAINING: CPT

## 2022-08-11 PROCEDURE — 97116 GAIT TRAINING THERAPY: CPT

## 2022-08-11 PROCEDURE — 97150 GROUP THERAPEUTIC PROCEDURES: CPT

## 2022-08-11 PROCEDURE — 97165 OT EVAL LOW COMPLEX 30 MIN: CPT

## 2022-08-11 PROCEDURE — 94799 UNLISTED PULMONARY SVC/PX: CPT

## 2022-08-11 PROCEDURE — 25010000002 CEFAZOLIN PER 500 MG: Performed by: ORTHOPAEDIC SURGERY

## 2022-08-11 PROCEDURE — 85018 HEMOGLOBIN: CPT | Performed by: ORTHOPAEDIC SURGERY

## 2022-08-11 PROCEDURE — 94760 N-INVAS EAR/PLS OXIMETRY 1: CPT

## 2022-08-11 PROCEDURE — 99213 OFFICE O/P EST LOW 20 MIN: CPT | Performed by: INTERNAL MEDICINE

## 2022-08-11 RX ORDER — ASPIRIN 325 MG
325 TABLET, DELAYED RELEASE (ENTERIC COATED) ORAL DAILY
Qty: 14 TABLET | Refills: 0 | Status: SHIPPED | OUTPATIENT
Start: 2022-08-11 | End: 2022-09-09

## 2022-08-11 RX ORDER — HYDROCODONE BITARTRATE AND ACETAMINOPHEN 7.5; 325 MG/1; MG/1
1-2 TABLET ORAL EVERY 4 HOURS PRN
Qty: 40 TABLET | Refills: 0 | Status: SHIPPED | OUTPATIENT
Start: 2022-08-11 | End: 2022-09-09

## 2022-08-11 RX ADMIN — HYDROCODONE BITARTRATE AND ACETAMINOPHEN 2 TABLET: 7.5; 325 TABLET ORAL at 20:40

## 2022-08-11 RX ADMIN — CEFAZOLIN 3 G: 10 INJECTION, POWDER, FOR SOLUTION INTRAVENOUS; PARENTERAL at 00:00

## 2022-08-11 RX ADMIN — HYDROCODONE BITARTRATE AND ACETAMINOPHEN 1 TABLET: 7.5; 325 TABLET ORAL at 13:05

## 2022-08-11 RX ADMIN — APIXABAN 2.5 MG: 2.5 TABLET, FILM COATED ORAL at 20:41

## 2022-08-11 RX ADMIN — APIXABAN 2.5 MG: 2.5 TABLET, FILM COATED ORAL at 08:09

## 2022-08-11 RX ADMIN — HYDROCODONE BITARTRATE AND ACETAMINOPHEN 2 TABLET: 7.5; 325 TABLET ORAL at 09:05

## 2022-08-11 RX ADMIN — HYDROCODONE BITARTRATE AND ACETAMINOPHEN 2 TABLET: 7.5; 325 TABLET ORAL at 04:57

## 2022-08-11 NOTE — OP NOTE
TOTAL KNEE ARTHROPLASTY WITH NETTE ROBOT  Procedure Report    Patient Name:  Vahid Bass  YOB: 1960    Date of Surgery:  8/10/2022     Indications:  The patient is a 62-year-old male with left knee osteoarthritis.  The patient has failed conservative measures and wishes to undergo left total knee replacement.  Risk and benefits of surgery were discussed.  Risk of bleeding, infection, damage to neurovascular structures, heart attack, stroke, DVT/PE, anesthesia complications including death, continued pain disability, need for additional procedures among others were discussed.  Informed consent was obtained and he wished to proceed.    Pre-op Diagnosis:   Primary osteoarthritis of knees, bilateral [M17.0]   Left knee osteoarthritis       Post-Op Diagnosis Codes:     * Primary osteoarthritis of knees, bilateral [M17.0]   Left knee osteoarthritis    Procedure/CPT® Codes:      Procedure(s):  LEFT TOTAL KNEE ARTHROPLASTY WITH NETTE ROBOT    Staff:  Surgeon(s):  Niles Amezquita MD    Assistant: Leslie Trinh; Korey Aponte PA    Anesthesia: General with Block    Estimated Blood Loss: 75 mL    Implants:    Implant Name Type Inv. Item Serial No.  Lot No. LRB No. Used Action   CMT BONE PALACOS R HI/VISC 1X40 - [id number] Implant CMT BONE PALACOS R HI/VISC 1X40  [facility] [id number] Left 2 Implanted   STEM TIB PERSONA CMT 5D SZF LT - [id number] Implant STEM TIB PERSONA CMT 5D SZF LT  [employer] [id number] Left 1 Implanted   PAT PERSONA ALLPOLY CMT 38MM - [id number] Implant PAT PERSONA ALLPOLY CMT 38MM  [employer] [id number] Left 1 Implanted   COMP FEM/KN PERSONA CR CMT COCR STD SZ11 LT - [id number] Implant COMP FEM/KN PERSONA CR CMT COCR STD SZ11 LT  [employer] [id number] Left 1 Implanted   ART/SRF KN PERSONA/VE PS EF 8TO11 12MM LT - [id number] Implant ART/SRF KN PERSONA/VE PS EF 8TO11 12MM LT  [employer] [id number] Left 1 Implanted       Specimen:          None         Findings: Left knee osteoarthritis    Complications: None    Description of Procedure: The patient was brought to the operating room and placed supine on the OR table.  General anesthesia was given.  Preoperatively, the patient received an adductor canal nerve block. The patient was placed supine on the OR table.  All bony prominences were padded. The tourniquet was placed on the thigh. The affected lower extremity was prepped and draped in the usual sterile fashion. Preoperative antibiotic was given. Tranexamic acid intravenously was given at the beginning and the end of the surgery.  At this point, an Esmarch was used to exsanguinate the limb and the tourniquet was inflated. A longitudinal incision was made over the knee and a medial parapatellar arthrotomy was performed.  Appropriate releases were performed to the proximal medial tibia. The pre-patellar fat pad was excised.  The patella was subluxed laterally and the knee was examined. There was severe tricompartmental wear and osteophyte formation.  The medial and lateral menisci were removed.  Osteophytes of  the femur were debrided.     At this point 2 threaded guidepins were placed at the distal femur and the tracking sensor for the femur for the Dinora robot were placed.  2 percutaneous stab incisions were made in the proximal tibial shaft and 2 guidepins were placed for the tibial tracking censor was placed.  We then registered the mechanical axis and femoral and tibial landmarks for the Dinora Robot.  We then assessed the flexion extension gaps and the flexion and extension deformity.  We made our surgical plan and then executed the distal femoral cut, the 4-in-1 femoral cut and the tibial cut.  The patella was then everted, resected, and sized.  Drill holes for the patella was made.  At this point the spacer block was placed in flexion extension and fit well.     We then placed the knee in flexion where laminar spreaders were used to open the flexion gaps.   The menisci were removed.  Osteophytes were removed from the posterior femur. The posterior capsule was injected with anesthetic cocktail.  At this point, the appropriately sized tibial tray was chosen.  This was pinned into place in line with the medial one third of the tibial tubercle. We then placed the trial femur. The trial insert was placed and the knee was brought to full extension. It was stable to varus and valgus stress.   The knee was then trialed with range of motion again. The femoral drill holes were made. The tibia was finished with the drill and the punch.  The components were removed. The knee was irrigated and dried. The cement was mixed and the tibia and femur were cemented into place.  The medial congruent spacer was then inserted.  The patella was cemented into place. The knee was irrigated with Irrisept and normal saline Pulsavac lavage.  The medial congruent polyethylene was inserted. The knee was stable to varus and valgus stress. There was good balance to the ligaments medially and laterally. There was good flexion with a stable patella. At this point, the tourniquet was released.  There was minimal bleeding controlled with electrocautery. The arthrotomy was closed with #1 Vicryl at 30 degrees of flexion, the subcutaneous tissues with 2-0 Vicryl, and the skin with staples.  The percutaneous stab incisions on the tibia were closed with 3-0 nylon in horizontal mattress fashion.  These incisions were covered with Xeroform, 4 x 4, Tegaderm.  An Aquacel dressing was placed and an Ace wrap was placed. Patient awoke from anesthesia in stable condition. There were no complications. All counts were correct.          Assistant: Leslie Trinh; Korey Aponte PA  was responsible for performing the following activities: Retraction, Suction, Irrigation and Placing Dressing and their skilled assistance was necessary for the success of this case.    Niles Amezquita MD     Date:  8/10/2022  Time: 20:57 EDT

## 2022-08-11 NOTE — PROGRESS NOTES
[facility]   Hospitalist Progress Note  Date: 2022  Patient Name: Vahid Bass  : 1960  MRN: [id number]  Date of admission: 8/10/2022      Subjective   Subjective     Chief Complaint: Follow up consult for management of chronic medical comorbidities including hyperlipidemia, MARIANNE, BPH    Interval Followup: No issues overnight.  Vital stable.  Labs unremarkable.  Weaned to room air.  Tolerating oral intake.  Left knee pain tolerable with oral narcotic regimen.  Worked with physical therapy this morning and this afternoon, they recommended he stay an additional as they feel he is not strong enough to go home today.      Review of Systems  Constitutional:  No Fever, No Chills  Respiratory:  No Cough, No Dyspnea  Gastrointestinal:  No Nausea, No Vomiting  Neuro:  + Weakness, No confusion    Objective   Objective     Vitals:   Temp:  [96 °F (35.6 °C)-98.7 °F (37.1 °C)] 98.7 °F (37.1 °C)  Heart Rate:  [53-68] 59  Resp:  [10-20] 10  BP: (100-146)/(56-74) 117/57  Flow (L/min):  [3] 3  Physical Exam    Constitutional: Awake, conversant, NAD   Eyes: Pupils equal and reactive, no conjunctival injection   HENT: NCAT, moist mucous membranes   Neck: Supple, trachea midline   Respiratory: Clear to auscultation bilaterally, nonlabored respirations    Cardiovascular: RRR, no murmurs, no pedal edema   Gastrointestinal: Positive bowel sounds, soft, nontender, nondistended   Musculoskeletal: Left knee dressing C/D/I, no clubbing or cyanosis to extremities   Neurologic: Oriented x 3, Cranial Nerves grossly intact speech clear   Skin: Warm and dry, no rashes     Result Review    Result Review:  I have personally reviewed the results from the time of this admission to 2022 13:38 EDT and agree with these findings:  [x]  Laboratory   CBC    CBC 22   WBC 6.25    RBC 4.57    Hemoglobin 12.4 (A) 11.7 (A)   Hematocrit 37.6 36.1 (A)   MCV 82.3    MCH 27.1    MCHC 33.0    RDW 14.6    Platelets 257    (A)  Abnormal value            BMP    BMP 8/1/22   BUN 14   Creatinine 1.02   Sodium 140   Potassium 3.8   Chloride 109 (A)   CO2 24.4   Calcium 9.7   (A) Abnormal value            []  Microbiology  []  Radiology  []  EKG/Telemetry   []  Cardiology/Vascular   []  Pathology  []  Old records  []  Other:    Assessment & Plan   Assessment / Plan     Assessment/Plan:  Active Hospital Problems    Diagnosis  POA   • Osteoarthritis of left knee, unspecified osteoarthritis type [M17.12]  Yes   • BPH (benign prostatic hyperplasia) [N40.0]  Yes   • MARIANNE (obstructive sleep apnea) [G47.33]  Unknown       Postop day 1 left knee replacement  Stop IV fluids   Continue as needed p.o./IV narcotic regimen per orders  Continue bowel regimen  Encourage incentive spirometry  Home NIPPV at night and with naps  Continue Flomax 0.4 mg daily  Eliquis 2.5 mg twice daily for DVT prophylaxis  Continue PT/OT.  Patient will benefit from another day in the hospital for therapy per PT   Discussed plan with RN.    DVT prophylaxis:  Medical and mechanical DVT prophylaxis orders are present.    Electronically signed by Abimael Bautista DO, 08/11/22, 1:38 PM EDT.

## 2022-08-11 NOTE — THERAPY EVALUATION
Patient Name: Vahid Bass  : 1960    MRN: [id number]                              Today's Date: 2022       Admit Date: 8/10/2022    Visit Dx:     ICD-10-CM ICD-9-CM   1. Difficulty in walking  R26.2 719.7   2. Primary osteoarthritis of knees, bilateral  M17.0 715.16   3. Decreased activities of daily living (ADL)  Z78.9 V49.89     Patient Active Problem List   Diagnosis   • Screening for colon cancer   • Anemia   • Arthritis   • Hyperlipidemia   • Other acute sinusitis   • Class 3 severe obesity due to excess calories with serious comorbidity and body mass index (BMI) of 40.0 to 44.9 in adult ([facility])   • Osteoarthritis of left knee   • Osteoarthritis of left knee, unspecified osteoarthritis type   • BPH (benign prostatic hyperplasia)   • MARIANNE (obstructive sleep apnea)     Past Medical History:   Diagnosis Date   • Allergies    • Anemia     NO CURRENT S/S   • Arthritis    • BPH (benign prostatic hyperplasia)    • DVT (deep venous thrombosis) ([facility])      LOWER LEG, RESOLVED   • Foot pain, right    • Hyperlipidemia    • Positive skin test for tuberculosis     or positive PPD; INH x 9 months     Past Surgical History:   Procedure Laterality Date   • RHINOPLASTY     • TONSILLECTOMY     • TOTAL KNEE ARTHROPLASTY Left 8/10/2022    Procedure: LEFT TOTAL KNEE ARTHROPLASTY WITH NETTE ROBOT;  Surgeon: Niles Amezquita MD;  Location: [facility];  Service: Robotics - Ortho;  Laterality: Left;      General Information     Row Name 22 1413 22 1351       OT Time and Intention    Document Type therapy note (daily note)  -ES evaluation  -ES    Mode of Treatment individual therapy;occupational therapy  -ES individual therapy;occupational therapy  -ES    Row Name 22 1355          General Information    Patient Profile Reviewed yes  -ES     Prior Level of Function independent:  Patient reports independent with B and IADLs. Patient is employed. No device for mobility. Walk in shower,  standing shower completion. [place] in stance. No home O2 use.  -ES     Existing Precautions/Restrictions fall;weight bearing  -ES     Row Name 08/11/22 1351          Occupational Profile    Reason for Services/Referral (Occupational Profile) Patient is 62 yr old male admitted to [facility] on 8/10/2022 after failed conservative management of left knee osteoarthritis.  Patient is postop day 1 left total knee arthroplasty, weightbearing as tolerated left lower extremity. OT evaluation and treatment ordered d/t recent decline in ADLs/transfer ability. No previous OT services for current condition.  -ES     Row Name 08/11/22 1351          Living Environment    People in Home spouse  -ES     Row Name 08/11/22 1351          Home Main Entrance    Number of Stairs, Main Entrance one  -ES     Row Name 08/11/22 1351          Stairs Within Home, Primary    Stairs, Within Home, Primary set of stairs to second floor bedroom  -ES     Number of Stairs, Within Home, Primary twelve  -ES     Row Name 08/11/22 1413 08/11/22 1351       Cognition    Orientation Status (Cognition) --  patient receptive to all education and training provided. All questions addressed  -ES oriented x 4  Patient pleasant and cooperative, agreeable to therapy evaluation  -ES    Row Name 08/11/22 1413 08/11/22 1351       Safety Issues, Functional Mobility    Impairments Affecting Function (Mobility) balance;endurance/activity tolerance;pain;range of motion (ROM);strength  -ES balance;endurance/activity tolerance;pain;range of motion (ROM);strength  -ES          User Key  (r) = Recorded By, (t) = Taken By, (c) = Cosigned By    Initials Name Provider Type    ES Sapna Landin OT Occupational Therapist                 Mobility/ADL's     Row Name 08/11/22 1354          Bed Mobility    Comment, (Bed Mobility) Patient met seated in recliner at therapy arrival to room  -ES     Row Name 08/11/22 1414 08/11/22 1354       Transfers    Transfers -- sit-stand  transfer;stand-sit transfer  -ES    Comment, (Transfers) patient provided transfer training education on hand placement, body mechanics and RWX management with transfers from varying surfaces to ensure patient safety at time of discharge  -ES --    Sit-Stand [place] (Transfers) -- contact guard;minimum assist (75% patient effort);1 person assist;verbal cues  -ES    Stand-Sit [place] (Transfers) -- contact guard;1 person assist;verbal cues  -ES    Row Name 08/11/22 1354          Sit-Stand Transfer    Assistive Device (Sit-Stand Transfers) walker, front-wheeled  -ES     Row Name 08/11/22 1354          Stand-Sit Transfer    Assistive Device (Stand-Sit Transfers) walker, front-wheeled  -ES     Row Name 08/11/22 1414          Functional Mobility    Functional Mobility- Comment Patient initiates mobility towards sink for grooming in stance, patient tolerates 2 steps towards sink prior to knee buckling and patient requesting to return to seated position. Patient bends L knee with mobility, educated to maintain normal mobility pattern to increase patient safety and success with functional mobility.  -ES     Row Name 08/11/22 1414 08/11/22 [facility]4       Activities of Daily Living    BADL Assessment/Intervention upper body dressing;lower body dressing;grooming  -ES bathing;upper body dressing;lower body dressing;grooming;feeding;toileting  -    Row Name 08/11/22 1354          Mobility    Extremity Weight-bearing Status left lower extremity  -ES     Left Lower Extremity (Weight-bearing Status) weight-bearing as tolerated (WBAT)  -     Row Name 08/11/22 1354          Bathing Assessment/Intervention    [place] Level (Bathing) bathing skills;moderate assist (50% patient effort)  -ES     Row Name 08/11/22 1414 08/11/22 [facility]       Upper Body Dressing Assessment/Training    [place] Level (Upper Body Dressing) upper body dressing skills;don;pull-over garment;set up;independent  -ES upper body dressing skills;set  up  -ES    Position (Upper Body Dressing) unsupported sitting  -ES --    Row Name 08/11/22 1414 08/11/22 1354       Lower Body Dressing Assessment/Training    [place] Level (Lower Body Dressing) lower body dressing skills;don;pants/bottoms;shoes/slippers;socks;moderate assist (50% patient effort)  -ES lower body dressing skills;minimum assist (75% patient effort)  -ES    Comment, (Lower Body Dressing) patient provided education and training on adaptive lower body dressing strategies to incrase patient independence. Return demonstration provided for ensured understanding.  -ES --    Row Name 08/11/22 1414 08/11/22 1354       Grooming Assessment/Training    [place] Level (Grooming) grooming skills;oral care regimen;set up  -ES grooming skills;set up  -ES    Position (Grooming) unsupported sitting  -ES --    Comment, (Grooming) Patient is unable to complete ADLs standing at sink secondary to deficits with mobility. s/u provided in sitting position  -ES --    Row Name 08/11/22 1354          Self-Feeding Assessment/Training    [place] Level (Feeding) feeding skills;set up  -ES     Row Name 08/11/22 1354          Toileting Assessment/Training    [place] Level (Toileting) toileting skills;contact guard assist  -ES           User Key  (r) = Recorded By, (t) = Taken By, (c) = Cosigned By    Initials Name Provider Type    Sapna Rizvi OT Occupational Therapist               Obj/Interventions     Row Name 08/11/22 1355          Sensory Assessment (Somatosensory)    Sensory Assessment (Somatosensory) bilateral UE  -ES     Bilateral UE Sensory Assessment general sensation;intact  -ES     Row Name 08/11/22 1355          Vision Assessment/Intervention    Visual Impairment/Limitations WFL  -ES     Row Name 08/11/22 1355          Range of Motion Comprehensive    General Range of Motion bilateral upper extremity ROM WNL  -ES     Row Name 08/11/22 1355          Strength Comprehensive (MMT)    General Manual  Muscle Testing (MMT) Assessment no strength deficits identified  -ES     Comment, General Manual Muscle Testing (MMT) Assessment bilateral upper extremities assessed 4+/5 with no strength deficits noted at time of evaluation  -ES     Row Name 08/11/22 1355          Motor Skills    Motor Skills functional endurance  -ES     Functional Endurance fair minus  -ES     Row Name 08/11/22 1418 08/11/22 1355       Balance    Balance Assessment -- sitting dynamic balance;standing dynamic balance  -ES    Dynamic Sitting Balance -- independent  -ES    Position, Sitting Balance -- unsupported;sitting in chair  -ES    Dynamic Standing Balance -- minimal assist;1-person assist  -ES    Position/Device Used, Standing Balance -- supported;walker, front-wheeled  -ES    Balance Interventions sitting;standing;sit to stand;dynamic;moderate challenge;occupation based/functional task;weight shifting activity  -ES --          User Key  (r) = Recorded By, (t) = Taken By, (c) = Cosigned By    Initials Name Provider Type    ES Sapna Landin OT Occupational Therapist               Goals/Plan     Row Name 08/11/22 1405          Transfer Goal 1 (OT)    Activity/Assistive Device (Transfer Goal 1, OT) transfers, all;walker, rolling  -ES     [place] Level/Cues Needed (Transfer Goal 1, OT) modified independence  -ES     Time Frame (Transfer Goal 1, OT) long term goal (LTG);10 days  -ES     Row Name 08/11/22 1405          Bathing Goal 1 (OT)    Activity/Device (Bathing Goal 1, OT) bathing skills, all;shower chair  -ES     [place] Level/Cues Needed (Bathing Goal 1, OT) modified independence  -ES     Time Frame (Bathing Goal 1, OT) long term goal (LTG);10 days  -ES     Row Name 08/11/22 1405          Dressing Goal 1 (OT)    Activity/Device (Dressing Goal 1, OT) dressing skills, all  -ES     [place]/Cues Needed (Dressing Goal 1, OT) modified independence  -ES     Time Frame (Dressing Goal 1, OT) long term goal (LTG);10 days  -ES     Row  Name 08/11/22 1405          Toileting Goal 1 (OT)    Activity/Device (Toileting Goal 1, OT) toileting skills, all;commode, 3-in-1  -ES     [place] Level/Cues Needed (Toileting Goal 1, OT) modified independence  -ES     Time Frame (Toileting Goal 1, OT) long term goal (LTG);10 days  -ES     Row Name 08/11/22 1405          Grooming Goal 1 (OT)    Activity/Device (Grooming Goal 1, OT) grooming skills, all  -ES     [place] (Grooming Goal 1, OT) independent  -ES     Time Frame (Grooming Goal 1, OT) long term goal (LTG);10 days  -ES     Row Name 08/11/22 1405          Problem Specific Goal 1 (OT)    Problem Specific Goal 1 (OT) Patient will demonstrate fair plus dynamic activity tolerance with standing ADL task to demonstrate functional tolerance required for independent ADL routine completion at time of discharge  -ES     Time Frame (Problem Specific Goal 1, OT) long term goal (LTG);10 days  -ES     Row Name 08/11/22 1405          Therapy Assessment/Plan (OT)    Planned Therapy Interventions (OT) activity tolerance training;BADL retraining;functional balance retraining;occupation/activity based interventions;patient/caregiver education/training;transfer/mobility retraining;IADL retraining  -ES           User Key  (r) = Recorded By, (t) = Taken By, (c) = Cosigned By    Initials Name Provider Type    ES Sapna Landin, DEEDEE Occupational Therapist               Clinical Impression     Row Name 08/11/22 1400          Pain Assessment    Pretreatment Pain Rating 8/10  -ES     Posttreatment Pain Rating 10/10  -ES     Pain Location - Side/Orientation Left  -ES     Pain Location - knee  -ES     Pain Intervention(s) Nursing Notified;Medication (See MAR);MD notified (Comment)  Hospitalist in room, aware of patient pain levels  -ES     Row Name 08/11/22 1419 08/11/22 1400       Plan of Care Review    Plan of Care Reviewed With -- patient  -ES    Progress -- no change  -ES    Outcome Evaluation Patient provided education and  "training on use of adaptive strategies to increase patient safety and independence with B and IADL task engagement, transfer training to maxamize patient safety with all functional transfers, and home modification for patient success and independence at time of discharge. Patient receptive to all education and training provided.  -ES Patient has experienced decline in function from baseline status, presenting w/ deficits related to functional balance, pain, activity tolerance, safety awareness, transfers and mobility that impede patient independence with activities of daily living.  Patient would benefit from skilled Occupational Therapy intervention to maxamize patient safety, and promote return to baseline independence.  -ES    Row Name 08/11/22 1419 08/11/22 1400       Therapy Assessment/Plan (OT)    Patient/Family Therapy Goal Statement (OT) \"Get other knee done\"  -ES --    Rehab Potential (OT) -- good, to achieve stated therapy goals  -ES    Criteria for Skilled Therapeutic Interventions Met (OT) -- yes;meets criteria;skilled treatment is necessary  -ES    Therapy Frequency (OT) -- 5 times/wk  -ES    Row Name 08/11/22 1400          Therapy Plan Review/Discharge Plan (OT)    Equipment Needs Upon Discharge (OT) walker, rolling;commode chair  -ES     Anticipated Discharge Disposition (OT) home with outpatient therapy services  -ES     Row Name 08/11/22 1400          Vital Signs    O2 Delivery Pre Treatment room air  -ES     O2 Delivery Intra Treatment room air  -ES     O2 Delivery Post Treatment room air  -ES     Row Name 08/11/22 1400          Positioning and Restraints    Post Treatment Position chair  -ES     In Chair reclined;call light within reach;encouraged to call for assist;exit alarm on  -ES           User Key  (r) = Recorded By, (t) = Taken By, (c) = Cosigned By    Initials Name Provider Type    Sapna Rizvi, OT Occupational Therapist               Outcome Measures     Row Name 08/11/22 1407       "    How much help from another is currently needed...    Putting on and taking off regular lower body clothing? 2  -ES     Bathing (including washing, rinsing, and drying) 2  -ES     Toileting (which includes using toilet bed pan or urinal) 3  -ES     Putting on and taking off regular upper body clothing 4  -ES     Taking care of personal grooming (such as brushing teeth) 4  -ES     Eating meals 4  -ES     AM-PAC 6 Clicks Score (OT) 19  -ES     Row Name 08/11/22 1200 08/11/22 0845       How much help from another person do you currently need...    Turning from your back to your side while in flat bed without using bedrails? 3  -RH 3  -JJ    Moving from lying on back to sitting on the side of a flat bed without bedrails? 3  -RH 3  -JJ    Moving to and from a bed to a chair (including a wheelchair)? 2  -RH 2  -JJ    Standing up from a chair using your arms (e.g., wheelchair, bedside chair)? 2  -RH 2  -JJ    Climbing 3-5 steps with a railing? 1  -RH 1  -JJ    To walk in hospital room? 2  -RH 1  -JJ    AM-PAC 6 Clicks Score (PT) 13  -RH 12  -JJ    Highest level of mobility 4 --> Transferred to chair/commode  -RH 4 --> Transferred to chair/commode  -JJ    Row Name 08/11/22 1407          Functional Assessment    Outcome Measure Options AM-PAC 6 Clicks Daily Activity (OT);Optimal Instrument  -ES     Row Name 08/11/22 1407          Optimal Instrument    Optimal Instrument Optimal - 3  -ES     Bending/Stooping 3  -ES     Standing 3  -ES     Reaching 2  -ES     From the list, choose the 3 activities you would most like to be able to do without any difficulty Bending/stooping;Standing;Reaching  -ES     Total Score Optimal - 3 8  -ES           User Key  (r) = Recorded By, (t) = Taken By, (c) = Cosigned By    Initials Name Provider Type    RH Anatoly Briggs, KENDALL Physical Therapist Assistant    Lindsey Renee, RN Registered Nurse    Sapna Rizvi, OT Occupational Therapist                  OT Recommendation and Plan  Planned  Therapy Interventions (OT): activity tolerance training, BADL retraining, functional balance retraining, occupation/activity based interventions, patient/caregiver education/training, transfer/mobility retraining, IADL retraining  Therapy Frequency (OT): 5 times/wk  Plan of Care Review  Plan of Care Reviewed With: patient  Progress: no change  Outcome Evaluation: Patient provided education and training on use of adaptive strategies to increase patient safety and independence with B and IADL task engagement, transfer training to maxamize patient safety with all functional transfers, and home modification for patient success and independence at time of discharge. Patient receptive to all education and training provided.     Time Calculation:    Time Calculation- OT     Row Name 08/11/22 1412 08/11/22 1200          Time Calculation- OT    OT Received On 08/11/22  -ES --     OT Goal Re-Cert Due Date 08/20/22  -ES --            Timed Charges    44349 - Gait Training Minutes  -- 9  -RH     22493 - OT Self Care/Mgmt Minutes 25  -ES --            Untimed Charges    OT Eval/Re-eval Minutes 20  -ES --            Total Minutes    Timed Charges Total Minutes 25  -ES 9  -RH     Untimed Charges Total Minutes 20  -ES --      Total Minutes 45  -ES 9  -RH           User Key  (r) = Recorded By, (t) = Taken By, (c) = Cosigned By    Initials Name Provider Type     Anatoly Briggs, KENDALL Physical Therapist Assistant    Sapna Rizvi OT Occupational Therapist              Therapy Charges for Today     Code Description Service Date Service Provider Modifiers Qty    [id number]  OT SELF CARE/MGMT/TRAIN EA 15 MIN 8/11/2022 Sapna Landin OT GO 2    63587276696 HC OT EVAL LOW COMPLEXITY 2 8/11/2022 Sapna Landin OT GO 1               Sapna Landin OT  8/11/2022

## 2022-08-11 NOTE — PLAN OF CARE
Goal Outcome Evaluation:  Plan of Care Reviewed With: patient        Progress: no change  Outcome Evaluation: Patient has experienced decline in function from baseline status, presenting w/ deficits related to functional balance, pain, activity tolerance, safety awareness, transfers and mobility that impede patient independence with activities of daily living.  Patient would benefit from skilled Occupational Therapy intervention to maxamize patient safety, and promote return to baseline independence.

## 2022-08-11 NOTE — DISCHARGE SUMMARY
[facility]         HOSPITALIST  DISCHARGE SUMMARY    Patient Name: Vahid Bass  : 1960  MRN: [id number]    Date of Admission: 8/10/2022  Date of Discharge:  22  Primary Care Physician: Rodolfo Rao APRN    Consults     Date and Time Order Name Status Description    8/10/2022 10:19 AM Inpatient Hospitalist Consult            Active and Resolved Hospital Problems:  Active Hospital Problems    Diagnosis POA   • Osteoarthritis of left knee, unspecified osteoarthritis type [M17.12] Yes   • BPH (benign prostatic hyperplasia) [N40.0] Yes   • MARIANNE (obstructive sleep apnea) [G47.33] Unknown      Resolved Hospital Problems    Diagnosis POA   • **S/P total knee replacement, left [Z96.652] Not Applicable       Hospital Course     Hospital Course:  Vahid Bass is a 62 y.o. male with osteoarthritis of the left knee and persistent left knee pain that failed conservative management.  Underwent elective total left knee replacement on 8/10/2020 without complication.  No issues postoperatively.  Pain well controlled.  Vitals stable.  Labs unremarkable.  Tolerated oral intake.  Evaluated by PT and set up with outpatient therapy.  Narcotic prescription provided by orthopedic service.  Discharged on low-dose Eliquis twice daily followed by full dose aspirin.  At home care, new medications, follow-up instructions provided.  Will follow-up with orthopedic surgery in 2 weeks.  Discharged home in stable condition.    Day of Discharge     Vital Signs:  Temp:  [96 °F (35.6 °C)-98 °F (36.7 °C)] 96 °F (35.6 °C)  Heart Rate:  [53-83] 62  Resp:  [10-20] 10  BP: ()/(47-74) 116/58  Flow (L/min):  [3] 3  Physical Exam:   GENERAL: The patient is conversant and nontoxic.  HEENT: PERRLA, EOMI. Oropharynx clear. Moist mucous membranes.   NECK: Supple, trachea midline  HEART: Regular rate and rhythm without murmurs.  LUNGS: Equal air entry, clear to auscultation bilaterally.   ABDOMEN: Soft, positive bowel  sounds, nontender, nondistended  MSK: Left knee dressing c/d/i . No significant swelling or bruising  SKIN: No rash or open wounds   NEUROLOGIC: Alert, cranial nerves grossly intact      Discharge Details        Discharge Medications      New Medications      Instructions Start Date   apixaban 2.5 MG tablet tablet  Commonly known as: ELIQUIS   2.5 mg, Oral, 2 Times Daily      aspirin  MG tablet  Commonly known as: Ecotrin  Replaces: aspirin 81 MG chewable tablet   325 mg, Oral, Daily      HYDROcodone-acetaminophen 7.5-325 MG per tablet  Commonly known as: Norco   1-2 tablets, Oral, Every 4 Hours PRN         Continue These Medications      Instructions Start Date   HM VITAMIN B12 PO   1 tablet, Oral, Every 7 Days, TAKES ON MONDAYS/INST PER ANESTHESIA PROTOCOL      tamsulosin 0.4 MG capsule 24 hr capsule  Commonly known as: FLOMAX   1 capsule, Oral, Nightly      Vitamin D-3 25 MCG (1000 UT) capsule   1,000 Units, Oral, Every 7 Days, TAKES ON MONDAYS, INST PER ANESTHESIA PROTOCOL         Stop These Medications    aspirin 81 MG chewable tablet  Replaced by: aspirin  MG tablet            No Known Allergies    Discharge Disposition:  Home or Self Care    Diet:  Hospital:  Diet Order   Procedures   • Diet Regular       Discharge Activity:   Activity Instructions     Discharge Activity      Weightbearing as tolerated with walker  Physical therapy  Cold therapy  Change Aquacel postoperative day #3 to new dressing and then leave 5 to 7 days.  Pin site dressing changes daily  And shower with Aquacel in place  Call with any problems  Start Ecotrin after Eliquis completed          CODE STATUS:  There are no questions and answers to display.         Future Appointments   Date Time Provider Department Center   8/23/2022  1:30 PM Korey Aponte PA [facility]       Additional Instructions for the Follow-ups that You Need to Schedule     Discharge Follow-up with Specified Provider: Korey Aponte  EDWIN; 2 Weeks   As directed      To: Korey Aponte PA-C    Follow Up: 2 Weeks               Pertinent  and/or Most Recent Results     PROCEDURES:     LEFT TOTAL KNEE ARTHROPLASTY WITH NETTE ROBOT on 8/10/2022    LAB RESULTS:      Lab 08/11/22  0510   HEMOGLOBIN 11.7*   HEMATOCRIT 36.1*                             Brief Urine Lab Results     None        Microbiology Results (last 10 days)     ** No results found for the last 240 hours. **          XR Knee 1 or 2 View Left    Result Date: 8/10/2022  Impression:  Status post left knee arthroplasty with no evidence of complication      VALDEMAR MAS MD       Electronically Signed and Approved By: VALDEMAR MAS MD on 8/10/2022 at 10:03                           Labs Pending at Discharge:  Pending Labs     Order Current Status    Extra Tubes Collected (08/11/22 0751)    Green Top (Gel) Collected (08/11/22 0751)            Time spent on Discharge including face to face service:  >30 minutes    Electronically signed by Abimael Bautista DO, 08/11/22, 8:34 AM EDT.

## 2022-08-11 NOTE — THERAPY TREATMENT NOTE
Acute Care - Physical Therapy Treatment Note   Chris     Patient Name: Vahid Bass  : 1960  MRN: [id number]  Today's Date: 2022 Gait- individual; ther- ex -group setting; 3 participants       Visit Dx:     ICD-10-CM ICD-9-CM   1. Difficulty in walking  R26.2 719.7   2. Primary osteoarthritis of knees, bilateral  M17.0 715.16   3. Decreased activities of daily living (ADL)  Z78.9 V49.89     Patient Active Problem List   Diagnosis   • Screening for colon cancer   • Anemia   • Arthritis   • Hyperlipidemia   • Other acute sinusitis   • Class 3 severe obesity due to excess calories with serious comorbidity and body mass index (BMI) of 40.0 to 44.9 in adult ([facility])   • Osteoarthritis of left knee   • Osteoarthritis of left knee, unspecified osteoarthritis type   • BPH (benign prostatic hyperplasia)   • MARIANNE (obstructive sleep apnea)     Past Medical History:   Diagnosis Date   • Allergies    • Anemia     NO CURRENT S/S   • Arthritis    • BPH (benign prostatic hyperplasia)    • DVT (deep venous thrombosis) ([facility])      LOWER LEG, RESOLVED   • Foot pain, right    • Hyperlipidemia    • Positive skin test for tuberculosis     or positive PPD; INH x 9 months     Past Surgical History:   Procedure Laterality Date   • RHINOPLASTY     • TONSILLECTOMY     • TOTAL KNEE ARTHROPLASTY Left 8/10/2022    Procedure: LEFT TOTAL KNEE ARTHROPLASTY WITH NETTE ROBOT;  Surgeon: Niles Amezquita MD;  Location: [facility];  Service: Robotics - Ortho;  Laterality: Left;     PT Assessment (last 12 hours)     PT Evaluation and Treatment     Row Name 22 1435 22 1202       Physical Therapy Time and Intention    Subjective Information complains of;pain;weakness;fatigue  - complains of;weakness;pain  -RH    Document Type therapy note (daily note)  -RH therapy note (daily note)  -    Mode of Treatment physical therapy;group therapy;individual therapy  -RH physical therapy;group therapy;individual therapy   -RH    Patient Effort fair  -RH fair  -RH    Comment Pt with overall improved tolerance of activity in PM.  - Pt with continued buckling of the L knee.  -[facility] Name 08/11/22 1202          Pain    Additional Documentation Pain Scale: FACES Pre/Post-Treatment (Group)  -[facility] Name 08/11/22 1435 08/11/22 1202       Pain Scale: FACES Pre/Post-Treatment    Pain: FACES Scale, Pretreatment 2-->hurts little bit  -RH 2-->hurts little bit  -RH    Posttreatment Pain Rating --  3/10  -RH 2-->hurts little bit  -RH    Pain Location - Side/Orientation Left  -RH Left  -RH    Pain Location - knee  -RH knee  -[facility] Name 08/11/22 1202          Range of Motion (ROM)    Range of Motion --  Pt L knee AAROM at 86 degrees flex and 9 degrees ext.  -[facility] Name 08/11/22 1202          Strength (Manual Muscle Testing)    Strength (Manual Muscle Testing) --  Pt L knee ext strength at 3-/5.  -[facility] Name 08/11/22 1435 08/11/22 1202       Transfers    Transfers sit-stand transfer;stand-sit transfer  - sit-stand transfer;stand-sit transfer  -    Comment, (Transfers) Pt in PM able to transfer sit to stand without rocking to create momentum.  - --    Sit-Stand [place] (Transfers) contact guard  - contact guard;minimum assist (75% patient effort)  -    Stand-Sit [place] (Transfers) contact guard  - contact guard  -[facility] Name 08/11/22 1435 08/11/22 1202       Sit-Stand Transfer    Assistive Device (Sit-Stand Transfers) walker, front-wheeled  - walker, front-wheeled  -    Comment, (Sit-Stand Transfer) Pt with improved stability in transfer in PM.  - Pt rocking back in chair to create momentum for transfer to stand.  -    Row Name 08/11/22 1435 08/11/22 1202       Stand-Sit Transfer    Assistive Device (Stand-Sit Transfers) walker, front-wheeled  - walker, front-wheeled  -RH    Row Name 08/11/22 1435 08/11/22 1202       Gait/Stairs (Locomotion)    Gait/Stairs Locomotion gait/ambulation  independence;gait/ambulation assistive device;distance ambulated;gait pattern;gait deviations  -RH gait/ambulation independence;gait/ambulation assistive device;distance ambulated;gait pattern;gait deviations  -RH    [place] Level (Gait) contact guard  -RH contact guard;minimum assist (75% patient effort)  -    Assistive Device (Gait) walker, front-wheeled  -RH walker, front-wheeled  -RH    Distance in Feet (Gait) 55  -RH 55  -RH    Pattern (Gait) 3-point;step-to  -RH 3-point;step-through  -RH    Deviations/Abnormal Patterns (Gait) base of support, wide;gait speed decreased;stride length decreased;left sided deviations  -RH base of support, narrow;gait speed decreased;stride length decreased;left sided deviations  -RH    Bilateral Gait Deviations forward flexed posture  -RH --    Left Sided Gait Deviations knee buckling, left side  -RH knee buckling, left side;decreased knee extension  -RH    Gait Assessment/Intervention Pt with improved quality of gait but with pt continued L knee buckling.  - Pt requires constant verbal and tactile cues to amb to prevent knee buckling.  -    Row Name 08/11/22 1435 08/11/22 1202       Balance    Dynamic Standing Balance contact guard  - contact guard;minimal assist  -    Position/Device Used, Standing Balance walker, front-wheeled  -RH walker, front-wheeled  -RH    Row Name             Wound 08/10/22 0748 Left anterior knee Incision    Wound - Properties Group Placement Date: 08/10/22  -SC Placement Time: 0748  -SC Present on Hospital Admission: N  -SC Side: Left  -SC Orientation: anterior  -SC Location: knee  -SC Primary Wound Type: Incision  -SC     Retired Wound - Properties Group Placement Date: 08/10/22  -SC Placement Time: 0748  -SC Present on Hospital Admission: N  -SC Side: Left  -SC Orientation: anterior  -SC Location: knee  -SC Primary Wound Type: Incision  -SC     Retired Wound - Properties Group Date first assessed: 08/10/22  -SC Time first assessed: 0748   -SC Present on Hospital Admission: N  -SC Side: Left  -SC Location: knee  -SC Primary Wound Type: Incision  -SC     Row Name 08/11/22 1202          Vital Signs    O2 Delivery Intra Treatment room air  -RH     Row Name 08/11/22 1435 08/11/22 1202       Progress Summary (PT)    Progress Toward Functional Goals (PT) progress toward functional goals is fair  -RH progress toward functional goals is fair  -RH          User Key  (r) = Recorded By, (t) = Taken By, (c) = Cosigned By    Initials Name Provider Type    SC Elana Gonzalez, RN Registered Nurse    RH Anatoly Briggs PTA Physical Therapist Assistant               Left Knee Ther-ex   Exercise  Reps  Sets    Long arc Quads   10 2   Short arc Quads   10 2   Heel Slides  10 2   Ankle Pumps  10 2   Quad sets  10 2   Glut sets  10 2   Straight leg raise  10 2          Physical Therapy Education                 Title: PT OT SLP Therapies (Done)     Topic: Physical Therapy (Done)     Point: Mobility training (Done)     Learning Progress Summary           Patient Acceptance, E,TB, VU by BOUBACAR at 8/10/2022 1354                   Point: Precautions (Done)     Learning Progress Summary           Patient Acceptance, E,TB, VU by BOUBACAR at 8/10/2022 1354                               User Key     Initials Effective Dates Name Provider Type Discipline     06/03/21 -  Umair Zhang, PT Physical Therapist PT              PT Recommendation and Plan     Progress Summary (PT)  Progress Toward Functional Goals (PT): progress toward functional goals is fair   Outcome Measures     Row Name 08/11/22 1200 08/10/22 1300          How much help from another person do you currently need...    Turning from your back to your side while in flat bed without using bedrails? 3  -RH 2  -BOUBACAR     Moving from lying on back to sitting on the side of a flat bed without bedrails? 3  -RH 2  -BOUBACAR     Moving to and from a bed to a chair (including a wheelchair)? 2  -RH 2  -BOUBACAR     Standing up from a chair using  your arms (e.g., wheelchair, bedside chair)? 2  -RH 2  -BOUBACAR     Climbing 3-5 steps with a railing? 1  -RH 2  -BOUBACAR     To walk in hospital room? 2  -RH 2  -BOUBACAR     AM-PAC 6 Clicks Score (PT) 13  -RH 12  -BOUBACAR            Functional Assessment    Outcome Measure Options -- AM-PAC 6 Clicks Basic Mobility (PT)  -BOUBACAR           User Key  (r) = Recorded By, (t) = Taken By, (c) = Cosigned By    Initials Name Provider Type     Anatoly Briggs PTA Physical Therapist Assistant    Umair Oconnell, PT Physical Therapist                 Time Calculation:    PT Charges     Row Name 08/11/22 1434 08/11/22 1200          Time Calculation    PT Received On 08/11/22  -RH 08/11/22  -RH            Timed Charges    72474 - Gait Training Minutes  8  -RH 9  -RH     61834 - PT Therapeutic Activity Minutes 3  -RH 4  -RH            Untimed Charges    PT Group Therapy Minutes 25  -RH 30  -RH            Total Minutes    Timed Charges Total Minutes 11  -RH 13  -RH     Untimed Charges Total Minutes 25  -RH 30  -RH      Total Minutes 36  -RH 43  -RH           User Key  (r) = Recorded By, (t) = Taken By, (c) = Cosigned By    Initials Name Provider Type     Anatoly Briggs PTA Physical Therapist Assistant              Therapy Charges for Today     Code Description Service Date Service Provider Modifiers Qty    45213798593 HC GAIT TRAINING EA 15 MIN 8/11/2022 Anatoly Briggs, PTA GP 1    24937876424 HC PT THER PROC GROUP 8/11/2022 Anatoly Briggs, KENDALL GP 1    76956832922 HC GAIT TRAINING EA 15 MIN 8/11/2022 Anatoly Briggs PTA GP 1    58515787162 HC PT THER PROC GROUP 8/11/2022 Anatoly Briggs PTA GP 1          PT G-Codes  Outcome Measure Options: AM-PAC 6 Clicks Daily Activity (OT), Optimal Instrument  AM-PAC 6 Clicks Score (PT): 13  AM-PAC 6 Clicks Score (OT): 19    Anatoly Briggs PTA  8/11/2022

## 2022-08-11 NOTE — THERAPY TREATMENT NOTE
Acute Care - Physical Therapy Treatment Note   Perez     Patient Name: Vahid Bass  : 1960  MRN: [id number]  Today's Date: 2022 Gait- individual; ther- ex -group setting; 4 participants         Visit Dx:     ICD-10-CM ICD-9-CM   1. Difficulty in walking  R26.2 719.7   2. Primary osteoarthritis of knees, bilateral  M17.0 715.16     Patient Active Problem List   Diagnosis   • Screening for colon cancer   • Anemia   • Arthritis   • Hyperlipidemia   • Other acute sinusitis   • Class 3 severe obesity due to excess calories with serious comorbidity and body mass index (BMI) of 40.0 to 44.9 in adult ([facility])   • Osteoarthritis of left knee   • Osteoarthritis of left knee, unspecified osteoarthritis type   • S/P total knee replacement, left   • BPH (benign prostatic hyperplasia)   • MARIANNE (obstructive sleep apnea)     Past Medical History:   Diagnosis Date   • Allergies    • Anemia     NO CURRENT S/S   • Arthritis    • BPH (benign prostatic hyperplasia)    • DVT (deep venous thrombosis) ([facility])      LOWER LEG, RESOLVED   • Foot pain, right    • Hyperlipidemia    • Positive skin test for tuberculosis     or positive PPD; INH x 9 months     Past Surgical History:   Procedure Laterality Date   • RHINOPLASTY     • TONSILLECTOMY     • TOTAL KNEE ARTHROPLASTY Left 8/10/2022    Procedure: LEFT TOTAL KNEE ARTHROPLASTY WITH NETTE ROBOT;  Surgeon: Niles Amezquita MD;  Location: [facility];  Service: Robotics - Ortho;  Laterality: Left;     PT Assessment (last 12 hours)     PT Evaluation and Treatment     Row Name 22 1202          Physical Therapy Time and Intention    Subjective Information complains of;weakness;pain  -RH     Document Type therapy note (daily note)  -     Mode of Treatment physical therapy;group therapy;individual therapy  -RH     Patient Effort fair  -     Comment Pt with continued buckling of the L knee.  -     Row Name 22 1202          Pain    Additional  Documentation Pain Scale: FACES Pre/Post-Treatment (Group)  -     Row Name 08/11/22 1202          Pain Scale: FACES Pre/Post-Treatment    Pain: FACES Scale, Pretreatment 2-->hurts little bit  -RH     Posttreatment Pain Rating 2-->hurts little bit  -RH     Pain Location - Side/Orientation Left  -     Pain Location - knee  -     Row Name 08/11/22 1202          Range of Motion (ROM)    Range of Motion --  Pt L knee AAROM at 86 degrees flex and 9 degrees ext.  -[facility] Name 08/11/22 1202          Strength (Manual Muscle Testing)    Strength (Manual Muscle Testing) --  Pt L knee ext strength at 3-/5.  -[facility] Name 08/11/22 1202          Transfers    Transfers sit-stand transfer;stand-sit transfer  -     Sit-Stand [place] (Transfers) contact guard;minimum assist (75% patient effort)  -     Stand-Sit [place] (Transfers) contact guard  -RH     Row Name 08/11/22 1202          Sit-Stand Transfer    Assistive Device (Sit-Stand Transfers) walker, front-wheeled  -     Comment, (Sit-Stand Transfer) Pt rocking back in chair to create momentum for transfer to stand.  -[facility] Name 08/11/22 1202          Stand-Sit Transfer    Assistive Device (Stand-Sit Transfers) walker, front-wheeled  -RH     Row Name 08/11/22 1202          Gait/Stairs (Locomotion)    Gait/Stairs Locomotion gait/ambulation independence;gait/ambulation assistive device;distance ambulated;gait pattern;gait deviations  -     [place] Level (Gait) contact guard;minimum assist (75% patient effort)  -     Assistive Device (Gait) walker, front-wheeled  -     Distance in Feet (Gait) 55  -     Pattern (Gait) 3-point;step-through  -     Deviations/Abnormal Patterns (Gait) base of support, narrow;gait speed decreased;stride length decreased;left sided deviations  -     Left Sided Gait Deviations knee buckling, left side;decreased knee extension  -     Gait Assessment/Intervention Pt requires constant verbal and tactile cues  to amb to prevent knee buckling.  -     Row Name 08/11/22 1202          Balance    Dynamic Standing Balance contact guard;minimal assist  -     Position/Device Used, Standing Balance walker, front-wheeled  -     Row Name             Wound 08/10/22 0748 Left anterior knee Incision    Wound - Properties Group Placement Date: 08/10/22  -SC Placement Time: 0748  -SC Present on Hospital Admission: N  -SC Side: Left  -SC Orientation: anterior  -SC Location: knee  -SC Primary Wound Type: Incision  -SC     Retired Wound - Properties Group Placement Date: 08/10/22  -SC Placement Time: 0748  -SC Present on Hospital Admission: N  -SC Side: Left  -SC Orientation: anterior  -SC Location: knee  -SC Primary Wound Type: Incision  -SC     Retired Wound - Properties Group Date first assessed: 08/10/22  -SC Time first assessed: 0748  -SC Present on Hospital Admission: N  -SC Side: Left  -SC Location: knee  -SC Primary Wound Type: Incision  -SC     Row Name 08/11/22 1202          Vital Signs    O2 Delivery Intra Treatment room air  -     Row Name 08/11/22 1202          Progress Summary (PT)    Progress Toward Functional Goals (PT) progress toward functional goals is fair  -RH           User Key  (r) = Recorded By, (t) = Taken By, (c) = Cosigned By    Initials Name Provider Type    Elana Hussein, RN Registered Nurse    RH Anatoly Briggs PTA Physical Therapist Assistant               Left Knee Ther-ex   Exercise  Reps  Sets    Long arc Quads   10 2   Short arc Quads   10 2   Heel Slides  10 2   Ankle Pumps  10 2   Quad sets  10 2   Glut sets  10 2   Straight leg raise  10 2          Physical Therapy Education                 Title: PT OT SLP Therapies (Done)     Topic: Physical Therapy (Done)     Point: Mobility training (Done)     Learning Progress Summary           Patient Acceptance, E,TB, VU by BOUBACAR at 8/10/2022 1354                   Point: Precautions (Done)     Learning Progress Summary           Patient Acceptance,  E,TB, VU by BOUBACAR at 8/10/2022 1354                               User Key     Initials Effective Dates Name Provider Type Discipline    BOUBACAR 06/03/21 -  Umair Zhang, PT Physical Therapist PT              PT Recommendation and Plan     Progress Summary (PT)  Progress Toward Functional Goals (PT): progress toward functional goals is fair   Outcome Measures     Row Name 08/11/22 1200 08/10/22 1300          How much help from another person do you currently need...    Turning from your back to your side while in flat bed without using bedrails? 3  -RH 2  -BOUBACAR     Moving from lying on back to sitting on the side of a flat bed without bedrails? 3  -RH 2  -BOUBACAR     Moving to and from a bed to a chair (including a wheelchair)? 2  -RH 2  -BOUBACAR     Standing up from a chair using your arms (e.g., wheelchair, bedside chair)? 2  -RH 2  -BOUBACAR     Climbing 3-5 steps with a railing? 1  -RH 2  -BOUBACAR     To walk in hospital room? 2  -RH 2  -BOUBACAR     AM-PAC 6 Clicks Score (PT) 13  -RH 12  -BOUBACAR            Functional Assessment    Outcome Measure Options -- AM-PAC 6 Clicks Basic Mobility (PT)  -BOUBACAR           User Key  (r) = Recorded By, (t) = Taken By, (c) = Cosigned By    Initials Name Provider Type    Anatoly Kumari PTA Physical Therapist Assistant    Umair Oconnell, PT Physical Therapist                 Time Calculation:    PT Charges     Row Name 08/11/22 1200             Time Calculation    PT Received On 08/11/22  -RH              Timed Charges    14788 - Gait Training Minutes  9  -RH      07241 - PT Therapeutic Activity Minutes 4  -RH              Untimed Charges    PT Group Therapy Minutes 30  -RH              Total Minutes    Timed Charges Total Minutes 13  -RH      Untimed Charges Total Minutes 30  -RH       Total Minutes 43  -RH            User Key  (r) = Recorded By, (t) = Taken By, (c) = Cosigned By    Initials Name Provider Type    Anatoly Kumari PTA Physical Therapist Assistant              Therapy Charges for Today     Code  Description Service Date Service Provider Modifiers Qty    71770323010 HC GAIT TRAINING EA 15 MIN 8/11/2022 Anatoly Briggs PTA GP 1    93662694990 HC PT THER PROC GROUP 8/11/2022 Anatoly Briggs PTA GP 1          PT G-Codes  Outcome Measure Options: AM-PAC 6 Clicks Basic Mobility (PT)  AM-PAC 6 Clicks Score (PT): 13    Anatoly Briggs PTA  8/11/2022

## 2022-08-11 NOTE — PLAN OF CARE
Goal Outcome Evaluation:   Pt stable throughout shift. Medicated for pain x1. Pt is max, x2 assist to stand and pivot. No further concerns.

## 2022-08-11 NOTE — PROGRESS NOTES
[facility]     Progress Note    Patient Name: Vahid Bass  : 1960  MRN: [id number]  Primary Care Physician:  Rodolfo Rao APRN  Date of admission: 8/10/2022    Subjective   Subjective     HPI:  Patient Reports doing pretty well this morning.  However he was not able to walk much last evening.  He denies any chest pain or shortness of air.    Review of Systems   See HPI    Objective   Objective     Vitals:   Temp:  [96 °F (35.6 °C)-98 °F (36.7 °C)] 96 °F (35.6 °C)  Heart Rate:  [53-83] 62  Resp:  [10-20] 10  BP: ()/(47-74) 116/58  Flow (L/min):  [3] 3  Physical Exam    General: Alert, no acute distress   Chest: Unlabored breathing, cardiovascular: Regular heart rate   Musculoskeletal: Neurovascular intact to extremity.  Positive pulses.  Dressing intact.  Negative Lizzy.    Result Review      Hemoglobin   Date Value Ref Range Status   2022 11.7 (L) 13.0 - 17.7 g/dL Final     Hematocrit   Date Value Ref Range Status   2022 36.1 (L) 37.5 - 51.0 % Final        Result Review:  I have personally reviewed the results from the time of this admission to 2022 07:51 EDT and agree with these findings:  [x]  Laboratory  []  Microbiology  [x]  Radiology  []  EKG/Telemetry   []  Cardiology/Vascular   []  Pathology  []  Old records  []  Other:      Assessment & Plan   Assessment / Plan     Brief Patient Summary:  Vahid Bass is a 62 y.o. male who is postoperative day #1 status post left total knee replacement    Active Hospital Problems:  Active Hospital Problems    Diagnosis    • **S/P total knee replacement, left    • Osteoarthritis of left knee, unspecified osteoarthritis type    • BPH (benign prostatic hyperplasia)    • MARIANNE (obstructive sleep apnea)      Plan: Weightbearing as tolerated with walker  Physical and Occupational Therapy  Pain control  DVT prophylaxis  Discharge planning: Discharge home with therapy when medically able       DVT prophylaxis:  Medical and mechanical DVT  prophylaxis orders are present.    CODE STATUS:      Disposition:  I expect patient to be discharged when medically able.    Electronically signed by Niles Amezquita MD, 08/11/22, 7:51 AM EDT.

## 2022-08-11 NOTE — SIGNIFICANT NOTE
08/11/22 0927   Plan   Final Discharge Disposition Code 01 - home or self-care   Final Note Home with outpatient therapy at [facility] in [place]. Appt: 8/12/22 at 10:30am

## 2022-08-12 VITALS
TEMPERATURE: 98.2 F | WEIGHT: 292.99 LBS | HEART RATE: 68 BPM | OXYGEN SATURATION: 93 % | BODY MASS INDEX: 41.02 KG/M2 | RESPIRATION RATE: 16 BRPM | SYSTOLIC BLOOD PRESSURE: 129 MMHG | DIASTOLIC BLOOD PRESSURE: 64 MMHG | HEIGHT: 71 IN

## 2022-08-12 PROCEDURE — 94799 UNLISTED PULMONARY SVC/PX: CPT

## 2022-08-12 PROCEDURE — 97535 SELF CARE MNGMENT TRAINING: CPT

## 2022-08-12 PROCEDURE — 97116 GAIT TRAINING THERAPY: CPT

## 2022-08-12 PROCEDURE — 99213 OFFICE O/P EST LOW 20 MIN: CPT | Performed by: INTERNAL MEDICINE

## 2022-08-12 PROCEDURE — 97530 THERAPEUTIC ACTIVITIES: CPT

## 2022-08-12 PROCEDURE — 97150 GROUP THERAPEUTIC PROCEDURES: CPT

## 2022-08-12 RX ADMIN — HYDROCODONE BITARTRATE AND ACETAMINOPHEN 2 TABLET: 7.5; 325 TABLET ORAL at 15:45

## 2022-08-12 RX ADMIN — APIXABAN 2.5 MG: 2.5 TABLET, FILM COATED ORAL at 08:55

## 2022-08-12 RX ADMIN — HYDROCODONE BITARTRATE AND ACETAMINOPHEN 2 TABLET: 7.5; 325 TABLET ORAL at 04:56

## 2022-08-12 RX ADMIN — HYDROCODONE BITARTRATE AND ACETAMINOPHEN 2 TABLET: 7.5; 325 TABLET ORAL at 08:55

## 2022-08-12 NOTE — THERAPY TREATMENT NOTE
Acute Care - Physical Therapy Treatment Note   Chris     Patient Name: Vahid Bass  : 1960  MRN: [id number]  Today's Date: 2022 Gait- individual; ther- ex -group setting; 5 participants         Visit Dx:     ICD-10-CM ICD-9-CM   1. Difficulty in walking  R26.2 719.7   2. Primary osteoarthritis of knees, bilateral  M17.0 715.16   3. Decreased activities of daily living (ADL)  Z78.9 V49.89     Patient Active Problem List   Diagnosis   • Screening for colon cancer   • Anemia   • Arthritis   • Hyperlipidemia   • Other acute sinusitis   • Class 3 severe obesity due to excess calories with serious comorbidity and body mass index (BMI) of 40.0 to 44.9 in adult ([facility])   • Osteoarthritis of left knee   • Osteoarthritis of left knee, unspecified osteoarthritis type   • BPH (benign prostatic hyperplasia)   • MARIANNE (obstructive sleep apnea)     Past Medical History:   Diagnosis Date   • Allergies    • Anemia     NO CURRENT S/S   • Arthritis    • BPH (benign prostatic hyperplasia)    • DVT (deep venous thrombosis) ([facility])      LOWER LEG, RESOLVED   • Foot pain, right    • Hyperlipidemia    • Positive skin test for tuberculosis     or positive PPD; INH x 9 months     Past Surgical History:   Procedure Laterality Date   • RHINOPLASTY     • TONSILLECTOMY     • TOTAL KNEE ARTHROPLASTY Left 8/10/2022    Procedure: LEFT TOTAL KNEE ARTHROPLASTY WITH NETTE ROBOT;  Surgeon: Niles Amezquita MD;  Location: [facility];  Service: Robotics - Ortho;  Laterality: Left;     PT Assessment (last 12 hours)     PT Evaluation and Treatment     Row Name 22 1500 22 1139       Physical Therapy Time and Intention    Subjective Information complains of;weakness;fatigue  - complains of;weakness;fatigue;pain  -RH    Document Type therapy note (daily note)  -RH therapy note (daily note)  -    Mode of Treatment physical therapy;group therapy;individual therapy  -RH physical therapy;group therapy;individual  therapy  -RH    Patient Effort fair  -RH fair  -RH    Row Name 08/12/22 1500 08/12/22 1139       Pain Scale: FACES Pre/Post-Treatment    Pain: FACES Scale, Pretreatment 2-->hurts little bit  -RH 2-->hurts little bit  -RH    Posttreatment Pain Rating 2-->hurts little bit  -RH 2-->hurts little bit  -RH    Pain Location - Side/Orientation Left  -RH --    Pain Location - knee  -RH --    [facility] Name 08/12/22 1139          Range of Motion (ROM)    Range of Motion --  Pt L knee AAROM at90 degrees flex and 8 degrees ext.  -[facility] Name 08/12/22 1139          Strength (Manual Muscle Testing)    Strength (Manual Muscle Testing) --  Pt L knee ext strength at 3-/5.  -[facility] Name 08/12/22 1500 08/12/22 1139       Transfers    Transfers sit-stand transfer;stand-sit transfer  - sit-stand transfer;stand-sit transfer  -    Sit-Stand [place] (Transfers) contact guard  - contact guard  -    Stand-Sit [place] (Transfers) contact guard  - contact guard  -[facility] Name 08/12/22 1500 08/12/22 1139       Sit-Stand Transfer    Assistive Device (Sit-Stand Transfers) walker, front-wheeled  - walker, front-wheeled  -    Comment, (Sit-Stand Transfer) -- Pt with less rocking back into chair to create momentum to stand.  -[facility] Name 08/12/22 1500 08/12/22 1139       Stand-Sit Transfer    Assistive Device (Stand-Sit Transfers) walker, front-wheeled  - walker, front-wheeled  -RH    Row Name 08/12/22 1500 08/12/22 1139       Gait/Stairs (Locomotion)    Gait/Stairs Locomotion gait/ambulation independence;distance ambulated;gait/ambulation assistive device;gait pattern;gait deviations  -RH gait/ambulation independence;gait/ambulation assistive device;distance ambulated;gait pattern;gait deviations  -    [place] Level (Gait) contact guard  - contact guard  -    Assistive Device (Gait) walker, front-wheeled  - walker, front-wheeled  -RH    Distance in Feet (Gait) 75  -RH 60  -RH    Pattern (Gait)  3-point;step-through  -RH 3-point;step-through  -RH    Deviations/Abnormal Patterns (Gait) base of support, wide;gait speed decreased;stride length decreased;antalgic  -RH base of support, wide;gait speed decreased;stride length decreased;antalgic  -RH    Bilateral Gait Deviations forward flexed posture  -RH --    Left Sided Gait Deviations heel strike decreased  -RH --    Negotiation (Stairs) stairs independence;handrail location;stairs assistive device;number of steps;ascending technique;descending technique  -RH --    [place] Level (Stairs) contact guard  -RH --    Handrail Location (Stairs) both sides  -RH --    Number of Steps (Stairs) 5  -RH --    Ascending Technique (Stairs) step-to-step  -RH --    Descending Technique (Stairs) step-to-step  -RH --    Row Name 08/12/22 1500 08/12/22 1139       Balance    Dynamic Standing Balance contact guard  -RH contact guard  -RH    Position/Device Used, Standing Balance walker, front-wheeled  -RH walker, front-wheeled  -RH    Row Name             Wound 08/10/22 0748 Left anterior knee Incision    Wound - Properties Group Placement Date: 08/10/22  -SC Placement Time: 0748  -SC Present on Hospital Admission: N  -SC Side: Left  -SC Orientation: anterior  -SC Location: knee  -SC Primary Wound Type: Incision  -SC     Retired Wound - Properties Group Placement Date: 08/10/22  -SC Placement Time: 0748  -SC Present on Hospital Admission: N  -SC Side: Left  -SC Orientation: anterior  -SC Location: knee  -SC Primary Wound Type: Incision  -SC     Retired Wound - Properties Group Date first assessed: 08/10/22  -SC Time first assessed: 0748  -SC Present on Hospital Admission: N  -SC Side: Left  -SC Location: knee  -SC Primary Wound Type: Incision  -SC     Row Name 08/12/22 1139          Vital Signs    O2 Delivery Intra Treatment room air  -RH     Row Name 08/12/22 1500 08/12/22 1139       Progress Summary (PT)    Progress Toward Functional Goals (PT) progress toward functional  goals is fair  -RH progress toward functional goals is fair  -RH          User Key  (r) = Recorded By, (t) = Taken By, (c) = Cosigned By    Initials Name Provider Type    Elana Hussein, RN Registered Nurse    RH Anatoly Briggs PTA Physical Therapist Assistant               Left Knee Ther-ex   Exercise  Reps  Sets    Long arc Quads   10 2   Short arc Quads   10 2   Heel Slides  10 2   Ankle Pumps  10 2   Quad sets  10 2   Glut sets  10 2   Straight leg raise  10 2          Physical Therapy Education                 Title: PT OT SLP Therapies (Resolved)     Topic: Physical Therapy (Resolved)     Point: Mobility training (Resolved)     Learning Progress Summary           Patient Acceptance, E,TB, VU by BOUBACAR at 8/10/2022 1354                   Point: Precautions (Resolved)     Learning Progress Summary           Patient Acceptance, E,TB, VU by BOUBACAR at 8/10/2022 1354                               User Key     Initials Effective Dates Name Provider Type Discipline     06/03/21 -  Umair Zhang, PT Physical Therapist PT              PT Recommendation and Plan     Progress Summary (PT)  Progress Toward Functional Goals (PT): progress toward functional goals is fair   Outcome Measures     Row Name 08/12/22 1100 08/11/22 1200 08/10/22 1300       How much help from another person do you currently need...    Turning from your back to your side while in flat bed without using bedrails? 3  -RH 3  -RH 2  -BOUBACAR    Moving from lying on back to sitting on the side of a flat bed without bedrails? 3  -RH 3  -RH 2  -BOUBACAR    Moving to and from a bed to a chair (including a wheelchair)? 3  -RH 2  -RH 2  -BOUBACAR    Standing up from a chair using your arms (e.g., wheelchair, bedside chair)? 3  -RH 2  -RH 2  -BOUBACAR    Climbing 3-5 steps with a railing? 2  -RH 1  -RH 2  -BOUBACAR    To walk in hospital room? 3  -RH 2  -RH 2  -BOUBACAR    AM-PAC 6 Clicks Score (PT) 17  -RH 13  -RH 12  -BOUBACAR       Functional Assessment    Outcome Measure Options -- -- AM-PAC 6  Clicks Basic Mobility (PT)  -BOUBACAR          User Key  (r) = Recorded By, (t) = Taken By, (c) = Cosigned By    Initials Name Provider Type    RH Anatoly Briggs PTA Physical Therapist Assistant    Umair Oconnell, PT Physical Therapist                 Time Calculation:    PT Charges     Row Name 08/12/22 1504 08/12/22 1137          Time Calculation    PT Received On 08/12/22  -RH 08/12/22  -RH            Timed Charges    18802 - Gait Training Minutes  8  -RH 8  -RH     55765 - PT Therapeutic Activity Minutes 4  -RH 4  -RH            Untimed Charges    PT Group Therapy Minutes 25  -RH 30  -RH            Total Minutes    Timed Charges Total Minutes 12  -RH 12  -RH     Untimed Charges Total Minutes 25  -RH 30  -RH      Total Minutes 37  -RH 42  -RH           User Key  (r) = Recorded By, (t) = Taken By, (c) = Cosigned By    Initials Name Provider Type     Anatoly Briggs PTA Physical Therapist Assistant              Therapy Charges for Today     Code Description Service Date Service Provider Modifiers Qty    82919667691 HC GAIT TRAINING EA 15 MIN 8/11/2022 Anatoly Briggs PTA GP 1    96227240936 HC PT THER PROC GROUP 8/11/2022 Anatoly Briggs PTA GP 1    58375045342 HC GAIT TRAINING EA 15 MIN 8/11/2022 Anatoly Briggs, PTA GP 1    07593957363 HC PT THER PROC GROUP 8/11/2022 Anatoly Briggs, PTA GP 1    08379731274 HC GAIT TRAINING EA 15 MIN 8/12/2022 Anatoly Briggs, PTA GP 1    05962770219 HC PT THER PROC GROUP 8/12/2022 Anatoly Briggs PTA GP 1    62064113307 HC GAIT TRAINING EA 15 MIN 8/12/2022 Anatoly Briggs PTA GP 1    69025983331 HC PT THER PROC GROUP 8/12/2022 Anatoly Briggs, PTA GP 1          PT G-Codes  Outcome Measure Options: AM-PAC 6 Clicks Daily Activity (OT), Optimal Instrument  AM-PAC 6 Clicks Score (PT): 17  AM-PAC 6 Clicks Score (OT): 21    Anatoly Briggs PTA  8/12/2022

## 2022-08-12 NOTE — THERAPY TREATMENT NOTE
Acute Care - Physical Therapy Treatment Note   Perez     Patient Name: Vahid Bass  : 1960  MRN: [id number]  Today's Date: 2022 Gait- individual; ther- ex -group setting; 5 participants         Visit Dx:     ICD-10-CM ICD-9-CM   1. Difficulty in walking  R26.2 719.7   2. Primary osteoarthritis of knees, bilateral  M17.0 715.16   3. Decreased activities of daily living (ADL)  Z78.9 V49.89     Patient Active Problem List   Diagnosis   • Screening for colon cancer   • Anemia   • Arthritis   • Hyperlipidemia   • Other acute sinusitis   • Class 3 severe obesity due to excess calories with serious comorbidity and body mass index (BMI) of 40.0 to 44.9 in adult ([facility])   • Osteoarthritis of left knee   • Osteoarthritis of left knee, unspecified osteoarthritis type   • BPH (benign prostatic hyperplasia)   • MARIANNE (obstructive sleep apnea)     Past Medical History:   Diagnosis Date   • Allergies    • Anemia     NO CURRENT S/S   • Arthritis    • BPH (benign prostatic hyperplasia)    • DVT (deep venous thrombosis) ([facility])      LOWER LEG, RESOLVED   • Foot pain, right    • Hyperlipidemia    • Positive skin test for tuberculosis     or positive PPD; INH x 9 months     Past Surgical History:   Procedure Laterality Date   • RHINOPLASTY     • TONSILLECTOMY     • TOTAL KNEE ARTHROPLASTY Left 8/10/2022    Procedure: LEFT TOTAL KNEE ARTHROPLASTY WITH NETTE ROBOT;  Surgeon: Niles Amezquita MD;  Location: [facility];  Service: Robotics - Ortho;  Laterality: Left;     PT Assessment (last 12 hours)     PT Evaluation and Treatment     Row Name 22 1136          Physical Therapy Time and Intention    Subjective Information complains of;weakness;fatigue;pain  -RH     Document Type therapy note (daily note)  -     Mode of Treatment physical therapy;group therapy;individual therapy  -     Patient Effort fair  -RH     Row Name 22 1084          Pain Scale: FACES Pre/Post-Treatment    Pain: FACES  Scale, Pretreatment 2-->hurts little bit  -RH     Posttreatment Pain Rating 2-->hurts little bit  -RH     [facility] Name 08/12/22 1139          Range of Motion (ROM)    Range of Motion --  Pt L knee AAROM at90 degrees flex and 8 degrees ext.  -[facility] Name 08/12/22 1139          Strength (Manual Muscle Testing)    Strength (Manual Muscle Testing) --  Pt L knee ext strength at 3-/5.  -[facility] Name 08/12/22 1139          Transfers    Transfers sit-stand transfer;stand-sit transfer  -     Sit-Stand [place] (Transfers) contact guard  -     Stand-Sit [place] (Transfers) contact guard  -[facility] Name 08/12/22 1139          Sit-Stand Transfer    Assistive Device (Sit-Stand Transfers) walker, front-wheeled  -     Comment, (Sit-Stand Transfer) Pt with less rocking back into chair to create momentum to stand.  -[facility] 08/12/22 1139          Stand-Sit Transfer    Assistive Device (Stand-Sit Transfers) walker, front-wheeled  -RH     Row Name 08/12/22 1139          Gait/Stairs (Locomotion)    Gait/Stairs Locomotion gait/ambulation independence;gait/ambulation assistive device;distance ambulated;gait pattern;gait deviations  -RH     [place] Level (Gait) contact guard  -     Assistive Device (Gait) walker, front-wheeled  -     Distance in Feet (Gait) 60  -RH     Pattern (Gait) 3-point;step-through  -RH     Deviations/Abnormal Patterns (Gait) base of support, wide;gait speed decreased;stride length decreased;antalgic  -RH     Row Name 08/12/22 1139          Balance    Dynamic Standing Balance contact guard  -     Position/Device Used, Standing Balance walker, front-wheeled  -     Row Name             Wound 08/10/22 0748 Left anterior knee Incision    Wound - Properties Group Placement Date: 08/10/22  -SC Placement Time: 0748  -SC Present on Hospital Admission: N  -SC Side: Left  -SC Orientation: anterior  -SC Location: knee  -SC Primary Wound Type: Incision  -SC     Retired Wound - Properties  Group Placement Date: 08/10/22  -SC Placement Time: 0748  -SC Present on Hospital Admission: N  -SC Side: Left  -SC Orientation: anterior  -SC Location: knee  -SC Primary Wound Type: Incision  -SC     Retired Wound - Properties Group Date first assessed: 08/10/22  -SC Time first assessed: 0748  -SC Present on Hospital Admission: N  -SC Side: Left  -SC Location: knee  -SC Primary Wound Type: Incision  -SC     Row Name 08/12/22 1139          Vital Signs    O2 Delivery Intra Treatment room air  -RH     Row Name 08/12/22 1139          Progress Summary (PT)    Progress Toward Functional Goals (PT) progress toward functional goals is fair  -RH           User Key  (r) = Recorded By, (t) = Taken By, (c) = Cosigned By    Initials Name Provider Type    SC Elana Gonzalez, RN Registered Nurse    Anatoly Kumari PTA Physical Therapist Assistant               Left Knee Ther-ex   Exercise  Reps  Sets    Long arc Quads   10 2   Short arc Quads   10 2   Heel Slides  10 2   Ankle Pumps  10 2   Quad sets  10 2   Glut sets  10 2   Straight leg raise  10 2          Physical Therapy Education                 Title: PT OT SLP Therapies (Done)     Topic: Physical Therapy (Done)     Point: Mobility training (Done)     Learning Progress Summary           Patient Acceptance, E,TB, VU by BOUBACAR at 8/10/2022 1354                   Point: Precautions (Done)     Learning Progress Summary           Patient Acceptance, E,TB, VU by BOUBACAR at 8/10/2022 1354                               User Key     Initials Effective Dates Name Provider Type Discipline    BOUBACAR 06/03/21 -  Umair Zhang, PT Physical Therapist PT              PT Recommendation and Plan     Progress Summary (PT)  Progress Toward Functional Goals (PT): progress toward functional goals is fair   Outcome Measures     Row Name 08/12/22 1100 08/11/22 1200 08/10/22 1300       How much help from another person do you currently need...    Turning from your back to your side while in flat bed  without using bedrails? 3  -RH 3  -RH 2  -BOUBACAR    Moving from lying on back to sitting on the side of a flat bed without bedrails? 3  -RH 3  -RH 2  -BOUBACAR    Moving to and from a bed to a chair (including a wheelchair)? 3  -RH 2  -RH 2  -BOUBACAR    Standing up from a chair using your arms (e.g., wheelchair, bedside chair)? 3  -RH 2  -RH 2  -BOUBACAR    Climbing 3-5 steps with a railing? 2  -RH 1  -RH 2  -BOUBACAR    To walk in hospital room? 3  -RH 2  -RH 2  -BOUBACAR    AM-PAC 6 Clicks Score (PT) 17  -RH 13  -RH 12  -BOUBACAR       Functional Assessment    Outcome Measure Options -- -- AM-PAC 6 Clicks Basic Mobility (PT)  -BOUBACAR          User Key  (r) = Recorded By, (t) = Taken By, (c) = Cosigned By    Initials Name Provider Type     Anatoly Briggs PTA Physical Therapist Assistant    Umair Oconnell, PT Physical Therapist                 Time Calculation:    PT Charges     Row Name 08/12/22 1137             Time Calculation    PT Received On 08/12/22  -RH              Timed Charges    11761 - Gait Training Minutes  8  -RH      21647 - PT Therapeutic Activity Minutes 4  -RH              Untimed Charges    PT Group Therapy Minutes 30  -RH              Total Minutes    Timed Charges Total Minutes 12  -RH      Untimed Charges Total Minutes 30  -RH       Total Minutes 42  -RH            User Key  (r) = Recorded By, (t) = Taken By, (c) = Cosigned By    Initials Name Provider Type     Anatoly Briggs PTA Physical Therapist Assistant              Therapy Charges for Today     Code Description Service Date Service Provider Modifiers Qty    06065052692 HC GAIT TRAINING EA 15 MIN 8/11/2022 Anatoly Briggs PTA GP 1    83121399891 HC PT THER PROC GROUP 8/11/2022 Anatoly Briggs PTA GP 1    21695587955 HC GAIT TRAINING EA 15 MIN 8/11/2022 Anatoly Briggs PTA GP 1    94364825681 HC PT THER PROC GROUP 8/11/2022 Anatoly Briggs PTA GP 1    62959966298 HC GAIT TRAINING EA 15 MIN 8/12/2022 Anatoly Briggs PTA GP 1    98409485489 HC PT THER PROC GROUP 8/12/2022 Anatoly Briggs  PTA GP 1          PT G-Codes  Outcome Measure Options: AM-PAC 6 Clicks Daily Activity (OT), Optimal Instrument  AM-PAC 6 Clicks Score (PT): 17  AM-PAC 6 Clicks Score (OT): 21    Anatoly Briggs, KENDALL  8/12/2022

## 2022-08-12 NOTE — PLAN OF CARE
Goal Outcome Evaluation:  Plan of Care Reviewed With: patient           Outcome Evaluation: Patient performed knee bends and ankle pumps x10 with mild difficulty. Alert and oriented, wearing cooling device. Able to ambulate to the bathroom with 1x assist and walker. VSS. Pain controlled with oral narcotic per MAR.

## 2022-08-12 NOTE — SIGNIFICANT NOTE
08/12/22 0857   Plan   Plan Comments  --    Final Discharge Disposition Code 01 - home or self-care   Final Note Home with outpatient therapy at [facility] in [place]. Appt: 8/15/22 at 9:30am

## 2022-08-12 NOTE — THERAPY EVALUATION
Patient Name: Vahid Bass  : 1960    MRN: [id number]                              Today's Date: 2022       Admit Date: 8/10/2022    Visit Dx:     ICD-10-CM ICD-9-CM   1. Difficulty in walking  R26.2 719.7   2. Primary osteoarthritis of knees, bilateral  M17.0 715.16   3. Decreased activities of daily living (ADL)  Z78.9 V49.89     Patient Active Problem List   Diagnosis   • Screening for colon cancer   • Anemia   • Arthritis   • Hyperlipidemia   • Other acute sinusitis   • Class 3 severe obesity due to excess calories with serious comorbidity and body mass index (BMI) of 40.0 to 44.9 in adult ([facility])   • Osteoarthritis of left knee   • Osteoarthritis of left knee, unspecified osteoarthritis type   • BPH (benign prostatic hyperplasia)   • MARIANNE (obstructive sleep apnea)     Past Medical History:   Diagnosis Date   • Allergies    • Anemia     NO CURRENT S/S   • Arthritis    • BPH (benign prostatic hyperplasia)    • DVT (deep venous thrombosis) ([facility])      LOWER LEG, RESOLVED   • Foot pain, right    • Hyperlipidemia    • Positive skin test for tuberculosis     or positive PPD; INH x 9 months     Past Surgical History:   Procedure Laterality Date   • RHINOPLASTY     • TONSILLECTOMY     • TOTAL KNEE ARTHROPLASTY Left 8/10/2022    Procedure: LEFT TOTAL KNEE ARTHROPLASTY WITH NETTE ROBOT;  Surgeon: Niles Amezquita MD;  Location: [facility];  Service: Robotics - Ortho;  Laterality: Left;      General Information     Row Name 2245          OT Time and Intention    Document Type therapy note (daily note)  -ES     Mode of Treatment individual therapy;occupational therapy  -ES     Row Name 22          General Information    Existing Precautions/Restrictions fall;weight bearing  -ES     Row Name 2245          Cognition    Orientation Status (Cognition) --  Patient pleasant and cooperative, agreeable to therapy participation  -ES     Row Name 22          Safety  Issues, Functional Mobility    Impairments Affecting Function (Mobility) balance;endurance/activity tolerance;pain;range of motion (ROM);strength  -ES           User Key  (r) = Recorded By, (t) = Taken By, (c) = Cosigned By    Initials Name Provider Type    Sapna Rizvi OT Occupational Therapist                 Mobility/ADL's     Row Name 08/12/22 0848          Bed Mobility    Comment, (Bed Mobility) Patient met seated in recliner at therapy arrival to room  -ES     Row Name 08/12/22 0848          Transfers    Transfers sit-stand transfer;stand-sit transfer  -ES     Comment, (Transfers) patient requires continued cueing and education for hand placement with transfers to ensure patient safety with sit <> stand and stand <> sit  -ES     Sit-Stand Loda (Transfers) contact guard;1 person assist  -ES     Stand-Sit Loda (Transfers) contact guard;1 person assist  -ES     Row Name 08/12/22 0848          Sit-Stand Transfer    Assistive Device (Sit-Stand Transfers) walker, front-wheeled  -ES     Row Name 08/12/22 0848          Stand-Sit Transfer    Assistive Device (Stand-Sit Transfers) walker, front-wheeled  -ES     Row Name 08/12/22 0848          Functional Mobility    Functional Mobility- Ind. Level contact guard assist;1 person;verbal cues required  -ES     Functional Mobility- Device walker, 4-wheeled  -ES     Functional Mobility- Comment Patient completes mobility to sink for standing ADLs and then completes short distance mobility in hallway to ensure patient safety with household distance mobility at time of discharge. Patient requires cueing with mobility to ensure safety with use of RWX. CGA with mobility.  -ES     Row Name 08/12/22 0848          Activities of Daily Living    BADL Assessment/Intervention upper body dressing;lower body dressing;grooming  -ES     Row Name 08/12/22 0848          Mobility    Extremity Weight-bearing Status left lower extremity  -ES     Left Lower Extremity  (Weight-bearing Status) weight-bearing as tolerated (WBAT)  -ES     Row Name 08/12/22 0848          Upper Body Dressing Assessment/Training    Burke Level (Upper Body Dressing) upper body dressing skills;don;pull-over garment;set up;independent  -ES     Position (Upper Body Dressing) unsupported sitting  -ES     Row Name 08/12/22 0848          Lower Body Dressing Assessment/Training    Burke Level (Lower Body Dressing) lower body dressing skills;don;pants/bottoms;shoes/slippers;minimum assist (75% patient effort)  -ES     Position (Lower Body Dressing) unsupported sitting;supported standing  -ES     Row Name 08/12/22 0848          Grooming Assessment/Training    Burke Level (Grooming) grooming skills;oral care regimen;set up  -ES     Position (Grooming) sink side;supported standing  -ES     Comment, (Grooming) Patient able to complete ADLs in stance at sink this session. Tolerates standing x4 mins with no rest breaks required  -ES           User Key  (r) = Recorded By, (t) = Taken By, (c) = Cosigned By    Initials Name Provider Type    Sapna Rizvi OT Occupational Therapist               Obj/Interventions     Row Name 08/12/22 0854          Balance    Balance Assessment sitting dynamic balance;standing dynamic balance  -ES     Dynamic Sitting Balance independent  -ES     Position, Sitting Balance unsupported;sitting in chair  -ES     Dynamic Standing Balance contact guard;1-person assist  -ES     Position/Device Used, Standing Balance supported;walker, front-wheeled  -ES     Balance Interventions sitting;standing;sit to stand;dynamic;occupation based/functional task;weight shifting activity  -ES           User Key  (r) = Recorded By, (t) = Taken By, (c) = Cosigned By    Initials Name Provider Type    Sapna Rizvi OT Occupational Therapist               Goals/Plan    No documentation.                Clinical Impression     Row Name 08/12/22 0856          Pain Assessment    Pretreatment  Pain Rating 4/10  -ES     Posttreatment Pain Rating 5/10  -ES     Pain Location - Side/Orientation Left  -ES     Pain Location - knee  -ES     Pain Intervention(s) Nursing Notified  -ES     Row Name 08/12/22 0856          Plan of Care Review    Progress improving  -ES     Outcome Evaluation Patient with good participation in therapy session today. Patient reports decreased pain with mobility, [facility] Patient tolerates sinkside ADLs this session and short distance mobility without complaints of pain. Patient continues to require assist with LB dressing, re educated on adaptive dressing strategies to assist patient at time of discharge. Patient would benefit from continued skilled OT intervention to promote return to independent baseline.  -ES     Row Name 08/12/22 0856          Positioning and Restraints    Post Treatment Position chair  -ES     In Chair sitting;call light within reach;encouraged to call for assist;exit alarm on  -ES           User Key  (r) = Recorded By, (t) = Taken By, (c) = Cosigned By    Initials Name Provider Type    ES Sapna Landin, DEEDEE Occupational Therapist               Outcome Measures     Row Name 08/12/22 0859          How much help from another is currently needed...    Putting on and taking off regular lower body clothing? 3  -ES     Bathing (including washing, rinsing, and drying) 3  -ES     Toileting (which includes using toilet bed pan or urinal) 3  -ES     Putting on and taking off regular upper body clothing 4  -ES     Taking care of personal grooming (such as brushing teeth) 4  -ES     Eating meals 4  -ES     AM-PAC 6 Clicks Score (OT) 21  -ES     Row Name 08/12/22 0859          Functional Assessment    Outcome Measure Options AM-PAC 6 Clicks Daily Activity (OT);Optimal Instrument  -ES     Row Name 08/12/22 0859          Optimal Instrument    Optimal Instrument Optimal - 3  -ES     Bending/Stooping 2  -ES     Standing 2   -ES     Reaching 1  -ES           User Key  (r) = Recorded By, (t) = Taken By, (c) = Cosigned By    Initials Name Provider Type    ES Sapna Landin OT Occupational Therapist                  OT Recommendation and Plan  Planned Therapy Interventions (OT): activity tolerance training, BADL retraining, functional balance retraining, occupation/activity based interventions, patient/caregiver education/training, transfer/mobility retraining, IADL retraining  Therapy Frequency (OT): 5 times/wk  Plan of Care Review  Plan of Care Reviewed With: patient  Progress: improving  Outcome Evaluation: Patient with good participation in therapy session today. Patient reports decreased pain with mobility, impacting patient participation in therapy session in [facility]. Patient tolerates sinkside ADLs this session and short distance mobility without complaints of pain. Patient continues to require assist with LB dressing, re educated on adaptive dressing strategies to assist patient at time of discharge. Patient would benefit from continued skilled OT intervention to promote return to independent baseline.     Time Calculation:    Time Calculation- OT     Row Name 08/12/22 0900             Time Calculation- OT    OT Received On 08/12/22  -ES      OT Goal Re-Cert Due Date 08/20/22  -ES              Timed Charges    47499 - OT Therapeutic Activity Minutes 14  -ES      37133 - OT Self Care/Mgmt Minutes 10  -ES              Total Minutes    Timed Charges Total Minutes 24  -ES       Total Minutes 24  -ES            User Key  (r) = Recorded By, (t) = Taken By, (c) = Cosigned By    Initials Name Provider Type    Sapna Rizvi OT Occupational Therapist              Therapy Charges for Today     Code Description Service Date Service Provider Modifiers Qty    04846990778  OT SELF CARE/MGMT/TRAIN EA 15 MIN 8/11/2022 Sapna Landin OT GO 2    31623041299  OT EVAL LOW COMPLEXITY 2 8/11/2022 Sapna Landin OT GO 1    [id number]   OT SELF CARE/MGMT/TRAIN EA 15 MIN 8/12/2022 Sapna Landin, OT GO 1    [id number]  OT THERAPEUTIC ACT EA 15 MIN 8/12/2022 Sapna Landin OT GO 1               Sapna Landin OT  8/12/2022

## 2022-08-12 NOTE — PROGRESS NOTES
[facility]     Progress Note    Patient Name: Vahid Bass  : 1960  MRN: [id number]  Primary Care Physician:  Rodolfo aRo APRN  Date of admission: 8/10/2022    Subjective   Subjective     HPI:  Patient Reports doing better this morning.  He had some pain medicine earlier this morning which helped some.  He denies any chest pain or shortness of air.  He is planning for discharge home later today.    Review of Systems   See HPI    Objective   Objective     Vitals:   Temp:  [97.6 °F (36.4 °C)-98.9 °F (37.2 °C)] 98.9 °F (37.2 °C)  Heart Rate:  [59-76] 76  Resp:  [10-18] 16  BP: (117-153)/(46-84) 153/84  Flow (L/min):  [0] 0  Physical Exam    General: Alert, no acute distress   Chest: Unlabored breathing, cardiovascular: Regular heart rate   Musculoskeletal: Neurovascular intact extremity.  Dressing intact.  Positive pulses.  Negative Lizzy.    Result Review      Hemoglobin   Date Value Ref Range Status   2022 11.7 (L) 13.0 - 17.7 g/dL Final     Hematocrit   Date Value Ref Range Status   2022 36.1 (L) 37.5 - 51.0 % Final        Result Review:  I have personally reviewed the results from the time of this admission to 2022 08:01 EDT and agree with these findings:  [x]  Laboratory  []  Microbiology  []  Radiology  []  EKG/Telemetry   []  Cardiology/Vascular   []  Pathology  []  Old records  []  Other:      Assessment & Plan   Assessment / Plan     Brief Patient Summary:  Vahid Bass is a 62 y.o. male who is postoperative day #2 status post left total knee replacement    Active Hospital Problems:  Active Hospital Problems    Diagnosis    • Osteoarthritis of left knee, unspecified osteoarthritis type    • BPH (benign prostatic hyperplasia)    • MARIANNE (obstructive sleep apnea)      Plan: Weightbearing as tolerated with walker  Physical and Occupational Therapy  Pain control  DVT prophylaxis  Discharge planning: Likely home later today       DVT prophylaxis:  Medical and mechanical DVT  prophylaxis orders are present.    CODE STATUS:      Disposition:  I expect patient to be discharged discharge home when medically able.    Electronically signed by Niles Amezquita MD, 08/12/22, 8:01 AM EDT.

## 2022-08-12 NOTE — PLAN OF CARE
Problem: Adult Inpatient Plan of Care  Goal: Plan of Care Review  Outcome: Adequate for Care Transition  Goal: Patient-Specific Goal (Individualized)  Outcome: Adequate for Care Transition  Goal: Absence of Hospital-Acquired Illness or Injury  Outcome: Adequate for Care Transition  Intervention: Identify and Manage Fall Risk  Recent Flowsheet Documentation  Taken 8/12/2022 0826 by Lindsey Linder RN  Safety Promotion/Fall Prevention: safety round/check completed  Intervention: Prevent and Manage VTE (Venous Thromboembolism) Risk  Recent Flowsheet Documentation  Taken 8/12/2022 0826 by Lindsey Linder RN  Activity Management: activity adjusted per tolerance  Taken 8/12/2022 0800 by Lindsey Linder RN  Activity Management:   activity adjusted per tolerance   activity encouraged  Goal: Optimal Comfort and Wellbeing  Outcome: Adequate for Care Transition  Intervention: Provide Person-Centered Care  Recent Flowsheet Documentation  Taken 8/12/2022 0826 by Lindsey Linder RN  Trust Relationship/Rapport:   care explained   choices provided  Goal: Readiness for Transition of Care  Outcome: Adequate for Care Transition     Problem: Fall Injury Risk  Goal: Absence of Fall and Fall-Related Injury  Outcome: Adequate for Care Transition  Intervention: Identify and Manage Contributors  Recent Flowsheet Documentation  Taken 8/12/2022 0826 by Lindsey Linder RN  Medication Review/Management: medications reviewed  Intervention: Promote Injury-Free Environment  Recent Flowsheet Documentation  Taken 8/12/2022 0826 by Lindsey Linder RN  Safety Promotion/Fall Prevention: safety round/check completed     Problem: Adjustment to Surgery (Knee Arthroplasty)  Goal: Optimal Coping  Outcome: Adequate for Care Transition     Problem: Bleeding (Knee Arthroplasty)  Goal: Absence of Bleeding  Outcome: Adequate for Care Transition  Intervention: Monitor and Manage Bleeding  Recent Flowsheet Documentation  Taken 8/12/2022 0826 by  Lindsey Linder RN  Bleeding Management: dressing monitored     Problem: Bowel Motility Impaired (Knee Arthroplasty)  Goal: Effective Bowel Elimination  Outcome: Adequate for Care Transition     Problem: Fluid and Electrolyte Imbalance (Knee Arthroplasty)  Goal: Fluid and Electrolyte Balance  Outcome: Adequate for Care Transition     Problem: Functional Ability Impaired (Knee Arthroplasty)  Goal: Optimal Functional Ability  Outcome: Adequate for Care Transition  Intervention: Promote Optimal Functional Status  Recent Flowsheet Documentation  Taken 8/12/2022 0826 by Lindsey Linder RN  Activity Management: activity adjusted per tolerance  Assistive Device Utilized:   gait belt   walker  Taken 8/12/2022 0800 by Lindsey Linder RN  Activity Management:   activity adjusted per tolerance   activity encouraged  Assistive Device Utilized:   gait belt   walker     Problem: Infection (Knee Arthroplasty)  Goal: Absence of Infection Signs and Symptoms  Outcome: Adequate for Care Transition     Problem: Neurovascular Compromise (Knee Arthroplasty)  Goal: Intact Neurovascular Status  Outcome: Adequate for Care Transition     Problem: Ongoing Anesthesia Effects (Knee Arthroplasty)  Goal: Anesthesia/Sedation Recovery  Outcome: Adequate for Care Transition  Intervention: Optimize Anesthesia Recovery  Recent Flowsheet Documentation  Taken 8/12/2022 0826 by Lindsey Linder RN  Safety Promotion/Fall Prevention: safety round/check completed  Taken 8/12/2022 0747 by Lindsey Linder RN  Patient Tolerance (IS): good  Level Incentive Spirometer (mL): 2500  Number of Repetitions (IS): 5     Problem: Pain (Knee Arthroplasty)  Goal: Acceptable Pain Control  Outcome: Adequate for Care Transition     Problem: Postoperative Nausea and Vomiting (Knee Arthroplasty)  Goal: Nausea and Vomiting Relief  Outcome: Adequate for Care Transition     Problem: Postoperative Urinary Retention (Knee Arthroplasty)  Goal: Effective Urinary  Elimination  Outcome: Adequate for Care Transition     Problem: Respiratory Compromise (Knee Arthroplasty)  Goal: Effective Oxygenation and Ventilation  Outcome: Adequate for Care Transition  Intervention: Optimize Oxygenation and Ventilation  Recent Flowsheet Documentation  Taken 8/12/2022 0747 by Lindsey Linder RN  Patient Tolerance (IS): good  Level Incentive Spirometer (mL): 2500  Number of Repetitions (IS): 5     Problem: Skin Injury Risk Increased  Goal: Skin Health and Integrity  Outcome: Adequate for Care Transition   Goal Outcome Evaluation:

## 2022-08-17 ENCOUNTER — OFFICE VISIT (OUTPATIENT)
Dept: FAMILY MEDICINE CLINIC | Facility: CLINIC | Age: 62
End: 2022-08-17

## 2022-08-17 VITALS
DIASTOLIC BLOOD PRESSURE: 72 MMHG | SYSTOLIC BLOOD PRESSURE: 132 MMHG | WEIGHT: 303.4 LBS | TEMPERATURE: 98.5 F | BODY MASS INDEX: 42.48 KG/M2 | OXYGEN SATURATION: 97 % | HEART RATE: 80 BPM | HEIGHT: 71 IN

## 2022-08-17 DIAGNOSIS — M17.12 OSTEOARTHRITIS OF LEFT KNEE, UNSPECIFIED OSTEOARTHRITIS TYPE: ICD-10-CM

## 2022-08-17 DIAGNOSIS — M25.572 ACUTE LEFT ANKLE PAIN: ICD-10-CM

## 2022-08-17 DIAGNOSIS — Z96.652 STATUS POST LEFT KNEE REPLACEMENT: Primary | ICD-10-CM

## 2022-08-17 PROCEDURE — 99214 OFFICE O/P EST MOD 30 MIN: CPT | Performed by: NURSE PRACTITIONER

## 2022-08-17 PROCEDURE — 1111F DSCHRG MED/CURRENT MED MERGE: CPT | Performed by: NURSE PRACTITIONER

## 2022-08-17 NOTE — PROGRESS NOTES
Vahid Bass is a 62 y.o. male admitted to [facility] and  is seen for follow up.  Chief Complaint   Patient presents with   • Hospital Follow Up Visit   • knee replacement     Left knee       No flowsheet data found.  Discharge summary is available.  Current outpatient and discharge medications have been reconciled for the patient.  Reviewed by: MALINDA Price    He was admitted for the following reason(s):  Primary admitting diagnosis at discharge was   • Osteoarthritis of left knee, unspecified osteoarthritis type [M17.12] Yes   • BPH (benign prostatic hyperplasia) [N40.0] Yes   • MARIANNE (obstructive sleep apnea) [G47.33] Unknown       Resolved Hospital Problems     Diagnosis POA   • **S/P total knee replacement, left [Z96.652] Not Applicable        She also reports having left ankle pain.  He is seeing Dr. Mccall orthopedic surgeon for his right ankle pain.  He would like to have updated referral for  does have Dr. Velazquez.  Evaluate his left ankle.    Procedures performed while hospitalized:Procedures Performed in Hospital: please see EPIC record for further details of results and finding    Pending results:  Pending tests: none    Pain Control:  well controlled    Diet: reguoar    Activity after Discharge: activity as tolerated    Items to be addressed at first follow up visit include:    1. Medication changes:   Discharge Medications            New Medications      Instructions Start Date   apixaban 2.5 MG tablet tablet  Commonly known as: ELIQUIS    2.5 mg, Oral, 2 Times Daily        aspirin  MG tablet  Commonly known as: Ecotrin  Replaces: aspirin 81 MG chewable tablet    325 mg, Oral, Daily        HYDROcodone-acetaminophen 7.5-325 MG per tablet  Commonly known as: Norco    1-2 tablets, Oral, Every 4 Hours PRN                      Continue These Medications      Instructions Start Date   HM VITAMIN B12 PO    1 tablet, Oral, Every 7 Days, TAKES ON MONDAYS/INST PER ANESTHESIA  "PROTOCOL        tamsulosin 0.4 MG capsule 24 hr capsule  Commonly known as: FLOMAX    1 capsule, Oral, Nightly        Vitamin D-3 25 MCG (1000 UT) capsule    1,000 Units, Oral, Every 7 Days, TAKES ON MONDAYS, INST PER ANESTHESIA PROTOCOL            Stop These Medications    aspirin 81 MG chewable tablet  Replaced by: aspirin  MG tablet            He reports his overall condition is are improving since discharge.  No specific complaints.  Social support is said to be adequate to meet his needs.     Objective   Vitals:    08/17/22 0836   BP: 132/72   BP Location: Right arm   Patient Position: Sitting   Pulse: 80   Temp: 98.5 °F (36.9 °C)   SpO2: 97%   Weight: (!) 138 kg (303 lb 6.4 oz)   Height: 180.3 cm (71\")     Body mass index is 42.32 kg/m².  Physical Exam  Vitals reviewed.   Constitutional:       Appearance: Normal appearance. He is well-developed. He is morbidly obese.   HENT:      Head: Normocephalic and atraumatic.      Right Ear: External ear normal.      Left Ear: External ear normal.      Mouth/Throat:      Pharynx: No oropharyngeal exudate.   Eyes:      Conjunctiva/sclera: Conjunctivae normal.      Pupils: Pupils are equal, round, and reactive to light.   Cardiovascular:      Rate and Rhythm: Normal rate and regular rhythm.      Heart sounds: No murmur heard.    No friction rub. No gallop.   Pulmonary:      Effort: Pulmonary effort is normal.      Breath sounds: Normal breath sounds. No wheezing or rhonchi.   Abdominal:      General: Bowel sounds are normal. There is no distension.      Palpations: Abdomen is soft.      Tenderness: There is no abdominal tenderness.   Musculoskeletal:      Left knee: Decreased range of motion. Tenderness present.   Skin:     General: Skin is warm and dry.   Neurological:      Mental Status: He is alert and oriented to person, place, and time.   Psychiatric:         Mood and Affect: Mood and affect normal.         Behavior: Behavior normal.         Thought Content: " Thought content normal.         Judgment: Judgment normal.         Assessment & Plan   Problem List Items Addressed This Visit        Musculoskeletal and Injuries    Osteoarthritis of left knee, unspecified osteoarthritis type    Status post left knee replacement - Primary      Other Visit Diagnoses     Acute left ankle pain        Relevant Orders    Ambulatory Referral to Orthopedic Surgery (Completed)        Discussed with patient to continue taking the Eliquis until seen by Dr. Amezquita.  Patient has not been taking the aspirin 325 mg daily.  Instructed him to take 325 mg daily dose.  Also instructed him to take stool softeners with his Norco.  Patient noted blood after wiping 1 time.  Instructed him to eat a high-fiber diet increase liquids and to not sit for prolonged time on the toilet.  If there is more bleeding to notify office or seek medical attention.  Will consult Dr. Velazquez evaluate his left ankle pain.  Already sees him for his right ankle pain.

## 2022-08-23 ENCOUNTER — OFFICE VISIT (OUTPATIENT)
Dept: ORTHOPEDIC SURGERY | Facility: CLINIC | Age: 62
End: 2022-08-23

## 2022-08-23 VITALS — BODY MASS INDEX: 41.75 KG/M2 | WEIGHT: 291.6 LBS | HEIGHT: 70 IN | OXYGEN SATURATION: 99 % | HEART RATE: 97 BPM

## 2022-08-23 DIAGNOSIS — Z47.89 AFTERCARE FOLLOWING SURGERY OF THE MUSCULOSKELETAL SYSTEM: Primary | ICD-10-CM

## 2022-08-23 DIAGNOSIS — Z47.89 AFTERCARE FOLLOWING SURGERY OF THE MUSCULOSKELETAL SYSTEM: ICD-10-CM

## 2022-08-23 PROCEDURE — 99024 POSTOP FOLLOW-UP VISIT: CPT | Performed by: PHYSICIAN ASSISTANT

## 2022-08-23 NOTE — PROGRESS NOTES
"Chief Complaint  Pain and Follow-up of the Left Knee and Suture / Staple Removal    Subjective          Vahid Bass is a 62 y.o. male  presents to [facility] for   History of Present Illness      Patient presents with his wife for follow-up evaluation of left total knee arthroplasty, 8/10/2022.  Patient states he stopped the pain medication for 5 days ago, he is taking DVT prophylaxis, he states he had some skin irritation from the adhesive from his bandages this just started showing today.  He states pain is present, he has been attending physical therapy, he denies calf pain, denies fever/chills, denies drainage from the incision site, staples were removed today.  He states he has trouble bending the knee and straightening the knee due to pain and stiffness.      No Known Allergies     Social History     Socioeconomic History   • Marital status:    Tobacco Use   • Smoking status: Passive Smoke Exposure - Never Smoker   • Smokeless tobacco: Never Used   Vaping Use   • Vaping Use: Never used   Substance and Sexual Activity   • Alcohol use: Yes     Comment: socially   • Drug use: Never   • Sexual activity: Defer        REVIEW OF SYSTEMS    Constitutional: Denies fevers, chills, weight loss  Cardiovascular: Denies chest pain, shortness of breath  Skin: Denies rashes, acute skin changes  Neurologic: Denies headache, loss of consciousness  MSK: Left knee pain      Objective   Vital Signs:   Pulse 97   Ht 177.8 cm (70\")   Wt 132 kg (291 lb 9.6 oz)   SpO2 99%   BMI 41.84 kg/m²     Body mass index is 41.84 kg/m².    Physical Exam    Left knee: Staples were removed today, incision is healing well, mild skin irritation surrounding the incision and appearance of adhesive pattern mild generalized swelling, no effusion.  Extension -10, flexion 60 with pain.  Stable to varus/valgus stress, stable anterior/posterior drawer.  Nontender calf, negative Lizzy testing.  Patient ambulates with " antalgic gait using a walker    Procedures    Imaging Results (Most Recent)     Procedure Component Value Units Date/Time    XR Knee 3 View Left [[id number]] Resulted: 08/23/22 1403     Updated: 08/23/22 1403    Narrative:      • View:AP, Lateral and Sunrise view(s)  • Site: Left knee  • Indication: Left knee pain  • Study: X-rays ordered, taken in the office, and reviewed today  • X-ray: Intact appearing left total knee arthroplasty, no signs of   hardware failure or loosening, no signs of periprosthetic fracture, good   alignment  • Comparative data: No comparative data found             Result Review :   The following data was reviewed by: JASON Rodriguez on 08/23/2022:  Data reviewed: Radiologic studies Reviewed by me with the patient and his wife             Assessment and Plan    Diagnoses and all orders for this visit:    1. Aftercare following surgery of left total knee arthroplasty , 8/10/22 (Primary)  -     XR Knee 3 View Left    2. Aftercare following surgery of the musculoskeletal system  -     XR Knee 3 View Left        Reviewed x-rays with the patient and his wife, advised them of concern for patient stiffness/limited range of motion in extension and in flexion, he was advised we recommend aggressive physical therapy, we recommended that he restart pain medication to allow therapy to be more aggressive with him, he was given order for Dynasplint in flexion and extension, patient was advised of future possibility of having manipulation performed if he does not improve his range of motion, he agreed follow-up in 4 weeks, we discussed following up sooner if he has no progress and would like to schedule the manipulation sooner than later.    Call or return if worsening symptoms.    Follow Up   Return in about 4 weeks (around 9/20/2022) for Recheck.  Patient was given instructions and counseling regarding his condition or for health maintenance advice. Please see specific information pulled into  the AVS if appropriate.

## 2022-09-09 ENCOUNTER — OFFICE VISIT (OUTPATIENT)
Dept: FAMILY MEDICINE CLINIC | Facility: CLINIC | Age: 62
End: 2022-09-09

## 2022-09-09 VITALS
HEART RATE: 79 BPM | TEMPERATURE: 97.8 F | WEIGHT: 296 LBS | HEIGHT: 71 IN | DIASTOLIC BLOOD PRESSURE: 72 MMHG | OXYGEN SATURATION: 98 % | BODY MASS INDEX: 41.44 KG/M2 | SYSTOLIC BLOOD PRESSURE: 130 MMHG

## 2022-09-09 DIAGNOSIS — M79.672 BILATERAL FOOT PAIN: Primary | ICD-10-CM

## 2022-09-09 DIAGNOSIS — M79.671 BILATERAL FOOT PAIN: Primary | ICD-10-CM

## 2022-09-09 PROCEDURE — 99213 OFFICE O/P EST LOW 20 MIN: CPT | Performed by: NURSE PRACTITIONER

## 2022-09-09 NOTE — PROGRESS NOTES
"Chief Complaint  Foot Pain (Bilateral feet )    Subjective        Vahid Bass presents to [facility]  History of Present Illness  Presents today for an acute visit.  Patient reports he is having bilateral ankle and foot pain.  Worst pain is on the left ankle.  He reports having pain on the medial side of his feet bilaterally.  Describes the pain as achy.  Recently had a knee replacement when she is having difficulty walking is doing physical therapy.  He has an appointment with Dr. Velazquez orthopedist.  He is requesting a referral for bilateral foot pain when he sees Dr. Mccall for his bilateral ankle pain.    Objective   Vital Signs:  /72   Pulse 79   Temp 97.8 °F (36.6 °C)   Ht 180.3 cm (71\")   Wt 134 kg (296 lb)   SpO2 98%   BMI 41.28 kg/m²   Estimated body mass index is 41.28 kg/m² as calculated from the following:    Height as of this encounter: 180.3 cm (71\").    Weight as of this encounter: 134 kg (296 lb).          Physical Exam  Vitals reviewed.   Constitutional:       Appearance: Normal appearance. He is well-developed.   HENT:      Head: Normocephalic and atraumatic.      Right Ear: External ear normal.      Left Ear: External ear normal.      Mouth/Throat:      Pharynx: No oropharyngeal exudate.   Eyes:      Conjunctiva/sclera: Conjunctivae normal.      Pupils: Pupils are equal, round, and reactive to light.   Cardiovascular:      Rate and Rhythm: Normal rate and regular rhythm.      Heart sounds: No murmur heard.    No friction rub. No gallop.   Pulmonary:      Effort: Pulmonary effort is normal.      Breath sounds: Normal breath sounds. No wheezing or rhonchi.   Musculoskeletal:      Right ankle: Swelling present. Tenderness present.      Left ankle: Swelling present. Tenderness present.      Right foot: Tenderness present. No swelling.      Left foot: Tenderness present. No swelling.   Skin:     General: Skin is warm and dry.   Neurological:      " Mental Status: He is alert and oriented to person, place, and time.   Psychiatric:         Mood and Affect: Affect normal.        Result Review :                Assessment and Plan   Diagnoses and all orders for this visit:    1. Bilateral foot pain (Primary)  -     Ambulatory Referral to Orthopedic Surgery    Put an update referral for bilateral foot pain for Dr. Mccall to evaluate and treat bilateral foot pain along with bilateral ankle pain.         Follow Up   Return if symptoms worsen or fail to improve.  Patient was given instructions and counseling regarding his condition or for health maintenance advice. Please see specific information pulled into the AVS if appropriate.

## 2022-09-23 ENCOUNTER — OFFICE VISIT (OUTPATIENT)
Dept: ORTHOPEDIC SURGERY | Facility: CLINIC | Age: 62
End: 2022-09-23

## 2022-09-23 ENCOUNTER — PREP FOR SURGERY (OUTPATIENT)
Dept: OTHER | Facility: HOSPITAL | Age: 62
End: 2022-09-23

## 2022-09-23 VITALS — HEIGHT: 71 IN | HEART RATE: 75 BPM | OXYGEN SATURATION: 95 % | WEIGHT: 293 LBS | BODY MASS INDEX: 41.02 KG/M2

## 2022-09-23 DIAGNOSIS — T84.82XA ARTHROFIBROSIS OF TOTAL KNEE ARTHROPLASTY, INITIAL ENCOUNTER: ICD-10-CM

## 2022-09-23 DIAGNOSIS — Z47.89 AFTERCARE FOLLOWING SURGERY OF THE MUSCULOSKELETAL SYSTEM: Primary | ICD-10-CM

## 2022-09-23 PROCEDURE — 99024 POSTOP FOLLOW-UP VISIT: CPT | Performed by: PHYSICIAN ASSISTANT

## 2022-09-23 PROCEDURE — 99214 OFFICE O/P EST MOD 30 MIN: CPT | Performed by: PHYSICIAN ASSISTANT

## 2022-09-23 RX ORDER — ASPIRIN 81 MG/1
81 TABLET, CHEWABLE ORAL DAILY
COMMUNITY

## 2022-09-23 NOTE — PRE-PROCEDURE INSTRUCTIONS
Patient instructed to have no food past midnight, clears up to 2 hours prior to arrival time. Patient instructed to wear no lotions, jewelry or piercing's day of surgery.  Patient to shower with surgical soap am of surgery.

## 2022-09-23 NOTE — H&P (VIEW-ONLY)
"Chief Complaint  Pain and Follow-up of the Left Knee    Subjective          Vahid Bass is a 62 y.o. male  presents to [facility] for   History of Present Illness      Patient presents with his wife for follow-up evaluation of left total knee arthroplasty, 8/10/2022.  At last visit he was very stiff, he states he has been working hard at therapy he also has had a Dynasplint he wears it daily since he got it.  He has stopped the pain medication.  He denies calf pain he has finished the DVT prophylaxis.  He is attending therapy about 3 times a week.  He presents with a therapy note.  He states he still has stiffness with range of motion.  He presents using a cane for ambulation.      No Known Allergies     Social History     Socioeconomic History   • Marital status:    Tobacco Use   • Smoking status: Passive Smoke Exposure - Never Smoker   • Smokeless tobacco: Never Used   Vaping Use   • Vaping Use: Never used   Substance and Sexual Activity   • Alcohol use: Yes     Comment: socially   • Drug use: Never   • Sexual activity: Defer        REVIEW OF SYSTEMS    Constitutional: Denies fevers, chills, weight loss  Cardiovascular: Denies chest pain, shortness of breath  Skin: Denies rashes, acute skin changes  Neurologic: Denies headache, loss of consciousness  MSK: Left knee pain      Objective   Vital Signs:   Pulse 75   Ht 180.3 cm (71\")   Wt 133 kg (293 lb)   SpO2 95%   BMI 40.87 kg/m²     Body mass index is 40.87 kg/m².    Physical Exam    Left knee: Incision is well-healed, no erythema, ecchymosis, no swelling, shiny appearance to incision.  Extension -6, flexion 84, nontender calf, negative Lizzy testing.    Procedures    Imaging Results (Most Recent)     None           Result Review :   The following data was reviewed by: JASON Rodriguez on 09/23/2022:               Assessment and Plan    Diagnoses and all orders for this visit:    1. Aftercare following surgery " of left total knee arthroplasty , 8/10/22 (Primary)  -     Ambulatory Referral to Physical Therapy Evaluate and treat (knee manipulation protocol), POST OP, Ortho    2. Arthrofibrosis of total knee arthroplasty, initial encounter (HCC)  -     Ambulatory Referral to Physical Therapy Evaluate and treat (knee manipulation protocol), POST OP, Ortho        Dr. Amezquita met with the patient also and discussed risk benefits procedure and recovery of left knee manipulation, patient agreed to have manipulation scheduled, follow-up 2 weeks postop.    Discussed surgery., Risks/benefits discussed with patient including, but not limited to: infection, bleeding, neurovascular damage, malunion, nonunion, aesthetic deformity, need for further surgery, and death., Surgery pamphlet given. and Call or return if worsening symptoms.    Follow Up   Return for 2 WEEKS POST OP.  Patient was given instructions and counseling regarding his condition or for health maintenance advice. Please see specific information pulled into the AVS if appropriate.

## 2022-09-23 NOTE — PROGRESS NOTES
"Chief Complaint  Pain and Follow-up of the Left Knee    Subjective          Vahid Bass is a 62 y.o. male  presents to [facility] for   History of Present Illness      Patient presents with his wife for follow-up evaluation of left total knee arthroplasty, 8/10/2022.  At last visit he was very stiff, he states he has been working hard at therapy he also has had a Dynasplint he wears it daily since he got it.  He has stopped the pain medication.  He denies calf pain he has finished the DVT prophylaxis.  He is attending therapy about 3 times a week.  He presents with a therapy note.  He states he still has stiffness with range of motion.  He presents using a cane for ambulation.      No Known Allergies     Social History     Socioeconomic History   • Marital status:    Tobacco Use   • Smoking status: Passive Smoke Exposure - Never Smoker   • Smokeless tobacco: Never Used   Vaping Use   • Vaping Use: Never used   Substance and Sexual Activity   • Alcohol use: Yes     Comment: socially   • Drug use: Never   • Sexual activity: Defer        REVIEW OF SYSTEMS    Constitutional: Denies fevers, chills, weight loss  Cardiovascular: Denies chest pain, shortness of breath  Skin: Denies rashes, acute skin changes  Neurologic: Denies headache, loss of consciousness  MSK: Left knee pain      Objective   Vital Signs:   Pulse 75   Ht 180.3 cm (71\")   Wt 133 kg (293 lb)   SpO2 95%   BMI 40.87 kg/m²     Body mass index is 40.87 kg/m².    Physical Exam    Left knee: Incision is well-healed, no erythema, ecchymosis, no swelling, shiny appearance to incision.  Extension -6, flexion 84, nontender calf, negative Lizzy testing.    Procedures    Imaging Results (Most Recent)     None           Result Review :   The following data was reviewed by: JASON Rodriguez on 09/23/2022:               Assessment and Plan    Diagnoses and all orders for this visit:    1. Aftercare following surgery " of left total knee arthroplasty , 8/10/22 (Primary)  -     Ambulatory Referral to Physical Therapy Evaluate and treat (knee manipulation protocol), POST OP, Ortho    2. Arthrofibrosis of total knee arthroplasty, initial encounter (HCC)  -     Ambulatory Referral to Physical Therapy Evaluate and treat (knee manipulation protocol), POST OP, Ortho        Dr. Amezquita met with the patient also and discussed risk benefits procedure and recovery of left knee manipulation, patient agreed to have manipulation scheduled, follow-up 2 weeks postop.    Discussed surgery., Risks/benefits discussed with patient including, but not limited to: infection, bleeding, neurovascular damage, malunion, nonunion, aesthetic deformity, need for further surgery, and death., Surgery pamphlet given. and Call or return if worsening symptoms.    Follow Up   Return for 2 WEEKS POST OP.  Patient was given instructions and counseling regarding his condition or for health maintenance advice. Please see specific information pulled into the AVS if appropriate.

## 2022-09-26 ENCOUNTER — HOSPITAL ENCOUNTER (OUTPATIENT)
Facility: HOSPITAL | Age: 62
Setting detail: HOSPITAL OUTPATIENT SURGERY
Discharge: HOME | End: 2022-09-26
Attending: ORTHOPAEDIC SURGERY | Admitting: ORTHOPAEDIC SURGERY

## 2022-09-26 ENCOUNTER — ANESTHESIA EVENT (OUTPATIENT)
Dept: PERIOP | Facility: HOSPITAL | Age: 62
End: 2022-09-26

## 2022-09-26 ENCOUNTER — ANESTHESIA (OUTPATIENT)
Dept: PERIOP | Facility: HOSPITAL | Age: 62
End: 2022-09-26

## 2022-09-26 VITALS
WEIGHT: 290.79 LBS | DIASTOLIC BLOOD PRESSURE: 79 MMHG | RESPIRATION RATE: 19 BRPM | HEART RATE: 59 BPM | BODY MASS INDEX: 40.71 KG/M2 | OXYGEN SATURATION: 99 % | TEMPERATURE: 97.5 F | HEIGHT: 71 IN | SYSTOLIC BLOOD PRESSURE: 120 MMHG

## 2022-09-26 PROCEDURE — 25010000002 ROPIVACAINE PER 1 MG: Performed by: ANESTHESIOLOGY

## 2022-09-26 PROCEDURE — 25010000002 DEXAMETHASONE PER 1 MG: Performed by: NURSE ANESTHETIST, CERTIFIED REGISTERED

## 2022-09-26 PROCEDURE — 25010000002 MIDAZOLAM PER 1 MG: Performed by: ANESTHESIOLOGY

## 2022-09-26 PROCEDURE — 25010000002 PROPOFOL 10 MG/ML EMULSION: Performed by: NURSE ANESTHETIST, CERTIFIED REGISTERED

## 2022-09-26 PROCEDURE — 27570 FIXATION OF KNEE JOINT: CPT | Performed by: ORTHOPAEDIC SURGERY

## 2022-09-26 PROCEDURE — 25010000002 ONDANSETRON PER 1 MG: Performed by: NURSE ANESTHETIST, CERTIFIED REGISTERED

## 2022-09-26 PROCEDURE — 25010000002 KETOROLAC TROMETHAMINE PER 15 MG: Performed by: NURSE ANESTHETIST, CERTIFIED REGISTERED

## 2022-09-26 RX ORDER — GLYCOPYRROLATE 0.2 MG/ML
0.2 INJECTION INTRAMUSCULAR; INTRAVENOUS
Status: COMPLETED | OUTPATIENT
Start: 2022-09-26 | End: 2022-09-26

## 2022-09-26 RX ORDER — KETOROLAC TROMETHAMINE 30 MG/ML
INJECTION, SOLUTION INTRAMUSCULAR; INTRAVENOUS AS NEEDED
Status: DISCONTINUED | OUTPATIENT
Start: 2022-09-26 | End: 2022-09-26 | Stop reason: SURG

## 2022-09-26 RX ORDER — LIDOCAINE HYDROCHLORIDE 20 MG/ML
INJECTION, SOLUTION EPIDURAL; INFILTRATION; INTRACAUDAL; PERINEURAL AS NEEDED
Status: DISCONTINUED | OUTPATIENT
Start: 2022-09-26 | End: 2022-09-26 | Stop reason: SURG

## 2022-09-26 RX ORDER — ONDANSETRON 2 MG/ML
4 INJECTION INTRAMUSCULAR; INTRAVENOUS ONCE AS NEEDED
Status: DISCONTINUED | OUTPATIENT
Start: 2022-09-26 | End: 2022-09-26 | Stop reason: HOSPADM

## 2022-09-26 RX ORDER — PROMETHAZINE HYDROCHLORIDE 12.5 MG/1
25 TABLET ORAL ONCE AS NEEDED
Status: DISCONTINUED | OUTPATIENT
Start: 2022-09-26 | End: 2022-09-26 | Stop reason: HOSPADM

## 2022-09-26 RX ORDER — DEXAMETHASONE SODIUM PHOSPHATE 4 MG/ML
INJECTION, SOLUTION INTRA-ARTICULAR; INTRALESIONAL; INTRAMUSCULAR; INTRAVENOUS; SOFT TISSUE AS NEEDED
Status: DISCONTINUED | OUTPATIENT
Start: 2022-09-26 | End: 2022-09-26 | Stop reason: SURG

## 2022-09-26 RX ORDER — PROPOFOL 10 MG/ML
VIAL (ML) INTRAVENOUS AS NEEDED
Status: DISCONTINUED | OUTPATIENT
Start: 2022-09-26 | End: 2022-09-26 | Stop reason: SURG

## 2022-09-26 RX ORDER — PROMETHAZINE HYDROCHLORIDE 25 MG/1
25 SUPPOSITORY RECTAL ONCE AS NEEDED
Status: DISCONTINUED | OUTPATIENT
Start: 2022-09-26 | End: 2022-09-26 | Stop reason: HOSPADM

## 2022-09-26 RX ORDER — OXYCODONE HYDROCHLORIDE 5 MG/1
5 TABLET ORAL
Status: COMPLETED | OUTPATIENT
Start: 2022-09-26 | End: 2022-09-26

## 2022-09-26 RX ORDER — MIDAZOLAM HYDROCHLORIDE 1 MG/ML
3 INJECTION INTRAMUSCULAR; INTRAVENOUS ONCE
Status: COMPLETED | OUTPATIENT
Start: 2022-09-26 | End: 2022-09-26

## 2022-09-26 RX ORDER — SODIUM CHLORIDE, SODIUM LACTATE, POTASSIUM CHLORIDE, CALCIUM CHLORIDE 600; 310; 30; 20 MG/100ML; MG/100ML; MG/100ML; MG/100ML
9 INJECTION, SOLUTION INTRAVENOUS CONTINUOUS PRN
Status: DISCONTINUED | OUTPATIENT
Start: 2022-09-26 | End: 2022-09-26 | Stop reason: HOSPADM

## 2022-09-26 RX ORDER — ACETAMINOPHEN 500 MG
1000 TABLET ORAL ONCE
Status: COMPLETED | OUTPATIENT
Start: 2022-09-26 | End: 2022-09-26

## 2022-09-26 RX ORDER — MEPERIDINE HYDROCHLORIDE 25 MG/ML
12.5 INJECTION INTRAMUSCULAR; INTRAVENOUS; SUBCUTANEOUS
Status: DISCONTINUED | OUTPATIENT
Start: 2022-09-26 | End: 2022-09-26 | Stop reason: HOSPADM

## 2022-09-26 RX ORDER — ROPIVACAINE HYDROCHLORIDE 5 MG/ML
INJECTION, SOLUTION EPIDURAL; INFILTRATION; PERINEURAL
Status: COMPLETED | OUTPATIENT
Start: 2022-09-26 | End: 2022-09-26

## 2022-09-26 RX ORDER — ONDANSETRON 2 MG/ML
INJECTION INTRAMUSCULAR; INTRAVENOUS AS NEEDED
Status: DISCONTINUED | OUTPATIENT
Start: 2022-09-26 | End: 2022-09-26 | Stop reason: SURG

## 2022-09-26 RX ADMIN — PROPOFOL 50 MG: 10 INJECTION, EMULSION INTRAVENOUS at 08:33

## 2022-09-26 RX ADMIN — PROPOFOL 150 MG: 10 INJECTION, EMULSION INTRAVENOUS at 08:30

## 2022-09-26 RX ADMIN — ROPIVACAINE HYDROCHLORIDE 30 ML: 5 INJECTION, SOLUTION EPIDURAL; INFILTRATION; PERINEURAL at 08:03

## 2022-09-26 RX ADMIN — MIDAZOLAM HYDROCHLORIDE 3 MG: 1 INJECTION, SOLUTION INTRAMUSCULAR; INTRAVENOUS at 07:55

## 2022-09-26 RX ADMIN — ACETAMINOPHEN 1000 MG: 500 TABLET, FILM COATED ORAL at 07:50

## 2022-09-26 RX ADMIN — KETOROLAC TROMETHAMINE 30 MG: 30 INJECTION, SOLUTION INTRAMUSCULAR; INTRAVENOUS at 08:33

## 2022-09-26 RX ADMIN — LIDOCAINE HYDROCHLORIDE 100 MG: 20 INJECTION, SOLUTION EPIDURAL; INFILTRATION; INTRACAUDAL; PERINEURAL at 08:30

## 2022-09-26 RX ADMIN — GLYCOPYRROLATE 0.2 MG: 0.2 INJECTION INTRAMUSCULAR; INTRAVENOUS at 08:12

## 2022-09-26 RX ADMIN — SODIUM CHLORIDE, POTASSIUM CHLORIDE, SODIUM LACTATE AND CALCIUM CHLORIDE 9 ML/HR: 600; 310; 30; 20 INJECTION, SOLUTION INTRAVENOUS at 07:50

## 2022-09-26 RX ADMIN — ONDANSETRON 4 MG: 2 INJECTION INTRAMUSCULAR; INTRAVENOUS at 08:33

## 2022-09-26 RX ADMIN — DEXAMETHASONE SODIUM PHOSPHATE 4 MG: 4 INJECTION, SOLUTION INTRA-ARTICULAR; INTRALESIONAL; INTRAMUSCULAR; INTRAVENOUS; SOFT TISSUE at 08:33

## 2022-09-26 RX ADMIN — OXYCODONE HYDROCHLORIDE 5 MG: 5 TABLET ORAL at 09:23

## 2022-09-26 RX ADMIN — OXYCODONE HYDROCHLORIDE 5 MG: 5 TABLET ORAL at 09:39

## 2022-09-26 NOTE — OP NOTE
KNEE MANIPULATION  Procedure Report    Patient Name:  Vahid Bass  YOB: 1960    Date of Surgery:  9/26/2022     Indications: The patient is a 62-year-old male who has had previous left knee replacement.  The patient developed postoperative knee arthrofibrosis and loss of range of motion.  We discussed treatment options with the patient and he wished to proceed with knee manipulation.  Risk and benefits of surgery were discussed and informed consent was obtained.    Pre-op Diagnosis:   Aftercare following surgery of the musculoskeletal system [Z47.89]  Arthrofibrosis of total knee arthroplasty, initial encounter ([facility]) [T84.82XA]       Post-Op Diagnosis Codes:     * Aftercare following surgery of the musculoskeletal system [Z47.89]     * Arthrofibrosis of total knee arthroplasty, initial encounter ([facility]) [T84.82XA]    Procedure/CPT® Codes:      Procedure(s):  LEFT KNEE MANIPULATION    Staff:  Surgeon(s):  Niles Amezquita MD               Anesthesia: General    Estimated Blood Loss: none    Implants:    Nothing was implanted during the procedure    Specimen:          None        Findings: Arthrofibrosis knee    Complications: None    Description of Procedure: The patient was brought to the operating room and placed supine on the stretcher.  Preoperatively the patient received an abductor canal nerve block.  The patient had a formal timeout performed.  Anesthesia was given with MAC anesthesia.  The patient's knee was examined.  He had around -10 degrees of extension and 100 degrees of flexion.  A manipulation was performed.  Audible and palpable release of adhesions was noted.  The final range of motion was -5 degrees of extension and 120 degrees of flexion.  The knee was stable and there was no evidence of iatrogenic fracture.  The patient was stable to recovery and there were no complications.    The patient will attend outpatient physical therapy today and daily for the next week.  He also has  home Dynasplint to use as well.              Niles Amezquita MD     Date: 9/26/2022  Time: 08:56 EDT

## 2022-09-26 NOTE — ANESTHESIA PREPROCEDURE EVALUATION
Anesthesia Evaluation     Patient summary reviewed and Nursing notes reviewed   no history of anesthetic complications:  NPO Solid Status: > 8 hours  NPO Liquid Status: > 2 hours           Airway   Mallampati: II  TM distance: >3 FB  Neck ROM: full  No difficulty expected  Dental      Pulmonary - normal exam    breath sounds clear to auscultation  (+) sleep apnea on CPAP,   Cardiovascular - normal exam  Exercise tolerance: good (4-7 METS)    Rhythm: regular  Rate: normal    (+) DVT resolved, hyperlipidemia,       Neuro/Psych- negative ROS  GI/Hepatic/Renal/Endo    (+) obesity,       Musculoskeletal     Abdominal    Substance History - negative use     OB/GYN negative ob/gyn ROS         Other   arthritis,      ROS/Med Hx Other: PAT Nursing Notes unavailable.                   Anesthesia Plan    ASA 3     general and general with block     (Patient understands anesthesia not responsible for dental damage.)  intravenous induction     Anesthetic plan, risks, benefits, and alternatives have been provided, discussed and informed consent has been obtained with: patient.    Use of blood products discussed with patient .   Plan discussed with CRNA.        CODE STATUS:

## 2022-09-26 NOTE — ANESTHESIA PROCEDURE NOTES
Peripheral Block      Patient reassessed immediately prior to procedure    Patient location during procedure: pre-op  Stop time: 9/26/2022 8:01 AM  Reason for block: at surgeon's request and post-op pain management  Performed by  Anesthesiologist: Sana Kim MD  Preanesthetic Checklist  Completed: patient identified, IV checked, site marked, risks and benefits discussed, surgical consent, monitors and equipment checked, pre-op evaluation and timeout performed  Prep:  Pt Position: supine  Sterile barriers:alcohol skin prep, partial drape, cap, washed/disinfected hands, mask and gloves  Prep: ChloraPrep  Patient monitoring: blood pressure monitoring, continuous pulse oximetry and EKG  Procedure    Sedation: yes  Performed under: local infiltration  Guidance:ultrasound guided and nerve stimulator    ULTRASOUND INTERPRETATION. Using ultrasound guidance a 20 G gauge needle was placed in close proximity to the nerve, at which point, under ultrasound guidance anesthetic was injected in the area of the nerve and spread of the anesthesia was seen on ultrasound in close proximity thereto.  There were no abnormalities seen on ultrasound; a digital image was taken; and the patient tolerated the procedure with no complications. Images:still images obtained, printed/placed on chart    Laterality:left  Block Type:adductor canal block  Injection Technique:single-shot  Needle Type:echogenic  Needle Gauge:20 G (4in)  Resistance on Injection: none    Medications Used: ropivacaine (NAROPIN) 0.5 % injection, 30 mL      Medications  Comment:With epi 1:200,000    Post Assessment  Injection Assessment: negative aspiration for heme, no paresthesia on injection and incremental injection  Patient Tolerance:comfortable throughout block  Complications:no  Additional Notes  The block or continuous infusion is requested by the referring physician for management of postoperative pain, or pain related to a procedure. Ultrasound guidance  (deemed medically necessary). Painless injection, pt was awake and conversant during the procedure without complications. Needle and surrounding structures visualized throughout procedure. No adverse reactions or complications seen during this period. Post-procedure image showed no signs of complication, and anatomy was consistent with an uncomplicated nerve blockade.

## 2022-09-26 NOTE — DISCHARGE INSTRUCTIONS
DISCHARGE INSTRUCTIONS  ORTHOPEDICS      For your surgery you had:  General anesthesia (you may have a sore throat for the first 24 hours)  IV sedation.  Local anesthesia  Monitored anesthesia care  You received a medicated patch for nausea prevention today (behind the ear). It is recommended that you remove it 24-48 hours post-operatively. It must be removed within 72 hours.   You received an anesthesia medication today that can cause hormonal forms of birth control to be ineffective. You should use a different form of birth control (to prevent pregnancy) for 7 days.   You may experience dizziness, drowsiness, or light-headedness for several hours following surgery  Do not stay alone today or tonight.  Limit your activity for 24 hours.  Resume your diet slowly.  Follow whatever special dietary instructions you may have been given by the doctor.  You should not drive or operate machinery or drink alcohol for 24 hours or while you are taking pain medication.  You should not sign any legally binding documents.  If you had an axillary or regional block, you will not have control of the involved limb for up to 12 hours.  Protect the arm with a sling or follow your physician's specific instructions.  You may remove dressing:  [] in 24 hours  [] in 48 hours  [] Other:    You may shower or bathe:    Sleep with the injured part elevated on a pillow.  Medications per physician's instructions as indicated on Discharge Medication Information Sheet.  Follow verbal instructions of your doctor.  Carry the upper arm in a sling so that the hand and wrist are above the level of the heart.  Sit with the lower leg propped up on a footstool or chair with pillows.  Exercise fingers or toes for 10 minutes every hour while awake. Ice bag to injured area for 72 hours.  Apply 20 minutes on - 20 minutes off.  Never place ice directly on skin or cast.    Avoid getting cast or dressing wet.  The Cold Therapy System can help reduce swelling  and decrease pain.  Utilize device for 30-60 minutes per session, with 30-60 minute breaks in between sessions.  It is recommended to use, as directed, for the first 72 hours after surgery until bedtime.  After 72 hours, continue using the device as needed until your follow-up appointment with your physician.  Never place directly on skin.  Please refer to the instruction sheet given.  In addition to these instructions, follow the discharge instructions on postoperative arthroscopic surgery.  SPECIAL INSTRUCTIONS:           Last dose of pain medication was given at:    NOTIFY THE PHYSICIAN IF YOU EXPERIENCE:  Numbness of fingers or toes.  Inability to move fingers or toes.  Extreme coldness, paleness or blue dis-coloration of fingers or toes.  Excessive swelling of affected surgical site or swelling that causes the cast to rub or cut into skin.  Pain unrelieved by pain medication  Nausea/vomiting not relieved by prescribed medication  Unable to urinate in 6 hours after surgery  Temperature greater than 101 degree Fahrenheit or chills  If unable to reach your doctor, please go to the closest emergency room  You should see   for follow-up care   on   .   Phone number:

## 2022-09-26 NOTE — ANESTHESIA POSTPROCEDURE EVALUATION
Patient: Vahid Bass    Procedure Summary     Date: 09/26/22 Room / Location: [facility] OR  / [facility]    Anesthesia Start: 0826 Anesthesia Stop: 0846    Procedure: LEFT KNEE MANIPULATION (Left Knee) Diagnosis:       Aftercare following surgery of the musculoskeletal system      Arthrofibrosis of total knee arthroplasty, initial encounter ([facility])      (Aftercare following surgery of the musculoskeletal system [Z47.89])      (Arthrofibrosis of total knee arthroplasty, initial encounter ([facility]) [T84.82XA])    Surgeons: Niles Amezquita MD Provider: Sana Kim MD    Anesthesia Type: general, general with block ASA Status: 3          Anesthesia Type: general, general with block    Vitals  Vitals Value Taken Time   /76 09/26/22 0916   Temp     Pulse 58 09/26/22 0918   Resp     SpO2 96 % 09/26/22 0918   Vitals shown include unvalidated device data.        Post Anesthesia Care and Evaluation    Patient location during evaluation: bedside  Patient participation: complete - patient participated  Level of consciousness: awake  Pain management: adequate    Airway patency: patent  Anesthetic complications: No anesthetic complications  PONV Status: none  Cardiovascular status: acceptable and stable  Respiratory status: acceptable  Hydration status: acceptable    Comments: An Anesthesiologist personally participated in the most demanding procedures (including induction and emergence if applicable) in the anesthesia plan, monitored the course of anesthesia administration at frequent intervals and remained physically present and available for immediate diagnosis and treatment of emergencies.

## 2022-09-27 ENCOUNTER — TELEPHONE (OUTPATIENT)
Dept: ORTHOPEDIC SURGERY | Facility: CLINIC | Age: 62
End: 2022-09-27

## 2022-09-27 NOTE — TELEPHONE ENCOUNTER
Caller: ROSINA AYOUB    Relationship to patient: SELF    Best call back number:     Patient is needing: THE PATIENT HAD A LEFT KNEE MANIPULATION YESTERDAY 9/26/22 WITH DR PETERSON. HE IS CONFUSED ABOUT HOW MUCH PHYSICAL THERAPY HE IS SUPPOSED TO DO BETWEEN NOW AND HIS POST-OP APPT ON 10/11/22.   PLEASE CALL THE PT BACK AT THE NUMBER ABOVE AND ADVISE.

## 2022-09-27 NOTE — TELEPHONE ENCOUNTER
I tried calling patient back however he is not accepting calls at this point.  Patient will need to do physical therapy every day for 5 days and then go to 2-3 times a week after that point.  Okay for Hub to relay message if he calls back.

## 2022-10-11 ENCOUNTER — OFFICE VISIT (OUTPATIENT)
Dept: ORTHOPEDIC SURGERY | Facility: CLINIC | Age: 62
End: 2022-10-11

## 2022-10-11 VITALS — HEIGHT: 71 IN | OXYGEN SATURATION: 98 % | WEIGHT: 292.4 LBS | BODY MASS INDEX: 40.94 KG/M2 | HEART RATE: 69 BPM

## 2022-10-11 DIAGNOSIS — Z47.89 AFTERCARE FOLLOWING SURGERY OF THE MUSCULOSKELETAL SYSTEM: Primary | ICD-10-CM

## 2022-10-11 DIAGNOSIS — Z47.89 AFTERCARE FOLLOWING SURGERY OF THE MUSCULOSKELETAL SYSTEM: ICD-10-CM

## 2022-10-11 PROCEDURE — 99024 POSTOP FOLLOW-UP VISIT: CPT | Performed by: PHYSICIAN ASSISTANT

## 2022-10-11 RX ORDER — DICLOFENAC SODIUM 75 MG/1
75 TABLET, DELAYED RELEASE ORAL 2 TIMES DAILY
Qty: 60 TABLET | Refills: 2 | Status: SHIPPED | OUTPATIENT
Start: 2022-10-11

## 2022-11-11 ENCOUNTER — OFFICE VISIT (OUTPATIENT)
Dept: ORTHOPEDIC SURGERY | Facility: CLINIC | Age: 62
End: 2022-11-11

## 2022-11-11 VITALS — WEIGHT: 292 LBS | HEIGHT: 71 IN | HEART RATE: 69 BPM | OXYGEN SATURATION: 96 % | BODY MASS INDEX: 40.88 KG/M2

## 2022-11-11 DIAGNOSIS — Z47.89 AFTERCARE FOLLOWING SURGERY OF THE MUSCULOSKELETAL SYSTEM: Primary | ICD-10-CM

## 2022-11-11 PROCEDURE — 99213 OFFICE O/P EST LOW 20 MIN: CPT | Performed by: ORTHOPAEDIC SURGERY

## 2022-11-11 RX ORDER — TAMSULOSIN HYDROCHLORIDE 0.4 MG/1
1 CAPSULE ORAL DAILY
COMMUNITY

## 2022-11-11 NOTE — PROGRESS NOTES
"Chief Complaint  Follow-up and Pain of the Left Knee     Subjective      Vahid Bass presents to [facility] for follow up evaluation of the left knee. left total knee arthroplasty, 8/10/2022 and manipulation on 9/26/22. The patient reports he is overall doing well. He has been attending physical therapy. He has been using Dynasplint. He has discontinue diclofenac. He reports he still has some stiffness in the morning that improves throughout the day.     No Known Allergies     Social History     Socioeconomic History   • Marital status:    Tobacco Use   • Smoking status: Never     Passive exposure: Yes   • Smokeless tobacco: Never   Vaping Use   • Vaping Use: Never used   Substance and Sexual Activity   • Alcohol use: Yes     Comment: socially   • Drug use: Never   • Sexual activity: Defer        Review of Systems     Objective   Vital Signs:   Pulse 69   Ht 180.3 cm (71\")   Wt 132 kg (292 lb)   SpO2 96%   BMI 40.73 kg/m²       Physical Exam  Constitutional:       Appearance: Normal appearance. The patient is well-developed and normal weight.   HENT:      Head: Normocephalic.      Right Ear: Hearing and external ear normal.      Left Ear: Hearing and external ear normal.      Nose: Nose normal.   Eyes:      Conjunctiva/sclera: Conjunctivae normal.   Cardiovascular:      Rate and Rhythm: Normal rate.   Pulmonary:      Effort: Pulmonary effort is normal.      Breath sounds: No wheezing or rales.   Abdominal:      Palpations: Abdomen is soft.      Tenderness: There is no abdominal tenderness.   Musculoskeletal:      Cervical back: Normal range of motion.   Skin:     Findings: No rash.   Neurological:      Mental Status: The patient is alert and oriented to person, place, and time.   Psychiatric:         Mood and Affect: Mood and affect normal.         Judgment: Judgment normal.       Ortho Exam      Left knee- incision well healed. Improved swelling. ROM -5 to 105 degrees. " Stable to varus/valgus stress. Stable to anterior/posterior drawer. Positive EHL, FHL, GS and TA. Sensation intact to all 5 nerves of the foot. Positive pulses. Negative Lachman's. Neurovascularly intact. Knee Extensor Mechanism  Intact.     Procedures    Imaging Results (Most Recent)     None           Result Review :       No results found.           Assessment and Plan     Diagnoses and all orders for this visit:    1. Aftercare following surgery of left knee manipulation, 9/26/2022 and left total knee arthroplasty , 8/10/22 (Primary)        Discussed the treatment plan with the patient.  Plan to continue Dynasplint and home exercises working on ROM and strength. He can use the diclofenac as needed for tightness and swelling. He has no other complaints. He can resume activity as tolerated.     Educated on risk of smoking. Discussed options for smoking cessation.   Call or return if worsening symptoms.    Follow Up     3 months      Patient was given instructions and counseling regarding his condition or for health maintenance advice. Please see specific information pulled into the AVS if appropriate.     Scribed for Niles Amezquita MD by Neeru Zavaleta.  11/11/22   10:04 EST    I have personally performed the services described in this document as scribed by the above individual and it is both accurate and complete. Niles Amezquita MD 11/12/22

## 2023-02-10 ENCOUNTER — OFFICE VISIT (OUTPATIENT)
Dept: ORTHOPEDIC SURGERY | Facility: CLINIC | Age: 63
End: 2023-02-10
Payer: OTHER GOVERNMENT

## 2023-02-10 VITALS — WEIGHT: 291.01 LBS | HEIGHT: 71 IN | BODY MASS INDEX: 40.74 KG/M2 | OXYGEN SATURATION: 95 % | HEART RATE: 84 BPM

## 2023-02-10 DIAGNOSIS — Z47.89 AFTERCARE FOLLOWING SURGERY OF THE MUSCULOSKELETAL SYSTEM: Primary | ICD-10-CM

## 2023-02-10 DIAGNOSIS — Z47.89 AFTERCARE FOLLOWING SURGERY OF THE MUSCULOSKELETAL SYSTEM: ICD-10-CM

## 2023-02-10 PROCEDURE — 99213 OFFICE O/P EST LOW 20 MIN: CPT | Performed by: PHYSICIAN ASSISTANT

## 2023-02-10 NOTE — PROGRESS NOTES
"Chief Complaint  Pain and Follow-up of the Left Knee    Subjective          History of Present Illness      Vahid Bass is a 62 y.o. male  presents to [facility] for     Patient presents for follow-up evaluation of left total knee arthroplasty, 8/10/2022 and left knee manipulation 9/26/2022.  He was last seen by Dr. Amezquita on 11/11/2022 he was advised to continue Dynasplint home exercises and diclofenac.  He states he stopped the Dynasplint shortly after his last visit.  He states that he is been having a lot of issues with his ankles and feet, he states his ankles and feet caused him difficulty with ambulation he has been seen by podiatry for these he also states he has been diagnosed with lower extremity edema, he has been treated for this.  Patient states pain in the knee is present with certain movements.  He admits to a \"tightness \"sensation that happens when he sits too long or stands too long.      No Known Allergies     Social History     Socioeconomic History   • Marital status:    Tobacco Use   • Smoking status: Never     Passive exposure: Yes   • Smokeless tobacco: Never   Vaping Use   • Vaping Use: Never used   Substance and Sexual Activity   • Alcohol use: Yes     Comment: socially   • Drug use: Never   • Sexual activity: Defer        REVIEW OF SYSTEMS    Constitutional: Denies fevers, chills, weight loss  Cardiovascular: Denies chest pain, shortness of breath  Skin: Denies rashes, acute skin changes  Neurologic: Denies headache, loss of consciousness  MSK: Left knee pain      Objective   Vital Signs:   Pulse 84   Ht 180.3 cm (71\")   Wt 132 kg (291 lb 0.1 oz)   SpO2 95%   BMI 40.59 kg/m²     Body mass index is 40.59 kg/m².    Physical Exam    Left knee: Well-healed incision, no erythema, no ecchymosis, no swelling, no effusion, extension -5, flexion 105, stable to varus/valgus stress, stable anterior/posterior drawer, nontender calf, negative Lizzy " testing, neurovascular intact, 5 out of 5 strength.    Procedures    Imaging Results (Most Recent)     Procedure Component Value Units Date/Time    XR Knee 3 View Left [[id number]] Resulted: 02/10/23 1246     Updated: 02/10/23 1246    Narrative:      • View:AP/Lateral and [place] view(s)  • Site: Left knee  • Indication: Left knee pain  • Study: X-rays ordered, taken in the office, and reviewed today  • X-ray: Intact appearing left total knee arthroplasty, no signs of   hardware failure or loosening, no subsidence or periprosthetic fracture,   good alignment  • Comparative data: No comparative data found             Result Review :   The following data was reviewed by: JASON Rodriguez on 02/10/2023:  Data reviewed: Radiologic studies Reviewed by me with the patient             Assessment and Plan    Diagnoses and all orders for this visit:    1. Aftercare following surgery of left total knee arthroplasty , 8/10/22 and manipulation 9/26/22 (Primary)  -     XR Knee 3 View Left    2. Aftercare following surgery of the musculoskeletal system  -     XR Knee 3 View Left        Reviewed x-rays with the patient discussed diagnosis and treatment options with him he was advised to continue activity, weightbearing as tolerated follow-up in 6 months with x-rays    Call or return if worsening symptoms.    Follow Up   Return in about 6 months (around 8/10/2023) for Recheck.  Patient was given instructions and counseling regarding his condition or for health maintenance advice. Please see specific information pulled into the AVS if appropriate.

## 2023-03-30 NOTE — PROGRESS NOTES
"Chief Complaint  Pain and Follow-up of the Left Knee    Subjective          Vahid Bass is a 62 y.o. male  presents to [facility] for   History of Present Illness      Patient presents for follow-up evaluation of left knee manipulation, 9/26/2022 and left total knee arthroplasty, 8/10/2022.  Patient presents using a cane for ambulation.  He states he does not have good balance at this time and needs the cane.  He states he has no pain in the knee but states his knee still feels stiff.  He has been attending physical therapy, he states he has been using the Dynasplint but has not been using it for several days.  He states after he use of the Dynasplint the knee swells.  He states he feels like he is \"walking slower \"since the manipulation.  He denies locking catching buckling of the knee, denies fever/chills, denies calf pain.      No Known Allergies     Social History     Socioeconomic History   • Marital status:    Tobacco Use   • Smoking status: Passive Smoke Exposure - Never Smoker   • Smokeless tobacco: Never   Vaping Use   • Vaping Use: Never used   Substance and Sexual Activity   • Alcohol use: Yes     Comment: socially   • Drug use: Never   • Sexual activity: Defer        REVIEW OF SYSTEMS    Constitutional: Denies fevers, chills, weight loss  Cardiovascular: Denies chest pain, shortness of breath  Skin: Denies rashes, acute skin changes  Neurologic: Denies headache, loss of consciousness  MSK: Left knee pain      Objective   Vital Signs:   Pulse 69   Ht 180.3 cm (71\")   Wt 133 kg (292 lb 6.4 oz)   SpO2 98%   BMI 40.78 kg/m²     Body mass index is 40.78 kg/m².    Physical Exam    Left knee: Incision is well-healed, no erythema, no ecchymosis, no swelling, extension -3, flexion 90, stable to varus/valgus stress, stable anterior/posterior drawer, nontender calf, negative Lizzy testing.    Procedures    Imaging Results (Most Recent)     Procedure Component Value Units " Date/Time    XR Knee 3 View Left [[id number]] Resulted: 10/11/22 1111     Updated: 10/11/22 1111    Narrative:      • View:AP, Lateral and Sunrise view(s)  • Site: Left knee  • Indication: Left knee pain  • Study: X-rays ordered, taken in the office, and reviewed today  • X-ray: Intact appearing left total knee arthroplasty, no signs of   hardware failure or loosening, no subsidence or periprosthetic fracture,   good alignment  • Comparative data: No comparative data found             Result Review :   The following data was reviewed by: JASON Rodriguez on 10/11/2022:  Data reviewed: Radiologic studies Reviewed by me with the patient             Assessment and Plan    Diagnoses and all orders for this visit:    1. Aftercare following surgery of left knee manipulation, 9/26/2022 and left total knee arthroplasty , 8/10/22 (Primary)  -     XR Knee 3 View Left  -     Ambulatory Referral to Physical Therapy Evaluate and treat (2-3X/WEEK FOR 6-8 WEEKS)    2. Aftercare following surgery of the musculoskeletal system  -     XR Knee 3 View Left  -     Ambulatory Referral to Physical Therapy Evaluate and treat (2-3X/WEEK FOR 6-8 WEEKS)    Other orders  -     diclofenac (VOLTAREN) 75 MG EC tablet; Take 1 tablet by mouth 2 (Two) Times a Day.  Dispense: 60 tablet; Refill: 2        Reviewed x-rays with the patient discussed diagnosis and treatment options, we discussed the importance of continuing physical therapy he will do this new order was given as well as he will do home exercises.  He was given return to work note.  He was advised to continue Dynasplint use and was given prescription for diclofenac, take as directed, follow-up in 4 weeks at [facility].    Call or return if worsening symptoms.    Follow Up   Return in about 4 weeks (around 11/8/2022) for Recheck.  Patient was given instructions and counseling regarding his condition or for health maintenance advice. Please see specific information pulled  into the AVS if appropriate.        no

## 2023-06-15 ENCOUNTER — OFFICE VISIT (OUTPATIENT)
Dept: FAMILY MEDICINE CLINIC | Facility: CLINIC | Age: 63
End: 2023-06-15
Payer: OTHER GOVERNMENT

## 2023-06-15 VITALS
BODY MASS INDEX: 44.1 KG/M2 | WEIGHT: 315 LBS | HEIGHT: 71 IN | DIASTOLIC BLOOD PRESSURE: 84 MMHG | TEMPERATURE: 98 F | HEART RATE: 57 BPM | SYSTOLIC BLOOD PRESSURE: 138 MMHG | OXYGEN SATURATION: 99 %

## 2023-06-15 DIAGNOSIS — M79.671 BILATERAL FOOT PAIN: Primary | ICD-10-CM

## 2023-06-15 DIAGNOSIS — M79.672 BILATERAL FOOT PAIN: Primary | ICD-10-CM

## 2023-06-15 PROCEDURE — 99213 OFFICE O/P EST LOW 20 MIN: CPT | Performed by: NURSE PRACTITIONER

## 2023-06-15 NOTE — PROGRESS NOTES
"Chief Complaint  Foot pain (Bilateral foot pain- has edema and has been wearing compression socks)    Subjective         Vahid Bass presents to [facility]  HPI   Resents today for follow-up on bilateral foot pain.  He has been experiencing bilateral foot pain for over the past year.  He has seen Dr. Valladares and Dr. Mccall.  Patient would like additional consultation and a podiatrist for second evaluation.  Patient has an abnormal gait with his bilateral feet pain.  Pain is worse on left foot.  He has been using compression socks for his lower extremity swelling.    Social History     Socioeconomic History    Marital status:    Tobacco Use    Smoking status: Never     Passive exposure: Yes    Smokeless tobacco: Never   Vaping Use    Vaping Use: Never used   Substance and Sexual Activity    Alcohol use: Yes     Comment: socially    Drug use: Never    Sexual activity: Defer        Objective     Vitals:    06/15/23 1043   BP: 138/84   BP Location: Left arm   Patient Position: Sitting   Cuff Size: Adult   Pulse: 57   Temp: 98 °F (36.7 °C)   TempSrc: Oral   SpO2: 99%   Weight: (!) 144 kg (317 lb 6.4 oz)   Height: 180.3 cm (71\")        Body mass index is 44.27 kg/m².    Wt Readings from Last 3 Encounters:   06/15/23 (!) 144 kg (317 lb 6.4 oz)   02/10/23 132 kg (291 lb 0.1 oz)   11/11/22 132 kg (292 lb)       BP Readings from Last 3 Encounters:   06/15/23 138/84   09/26/22 120/79   09/09/22 130/72         Physical Exam  Vitals reviewed.   Constitutional:       Appearance: Normal appearance. He is well-developed.   HENT:      Head: Normocephalic and atraumatic.      Right Ear: External ear normal.      Left Ear: External ear normal.      Mouth/Throat:      Pharynx: No oropharyngeal exudate.   Eyes:      Conjunctiva/sclera: Conjunctivae normal.      Pupils: Pupils are equal, round, and reactive to light.   Cardiovascular:      Rate and Rhythm: Normal rate and regular rhythm. "      Heart sounds: No murmur heard.    No friction rub. No gallop.   Pulmonary:      Effort: Pulmonary effort is normal.      Breath sounds: Normal breath sounds. No wheezing or rhonchi.   Musculoskeletal:      Right foot: Tenderness present.      Left foot: Tenderness present.   Skin:     General: Skin is warm and dry.   Neurological:      Mental Status: He is alert and oriented to person, place, and time.   Psychiatric:         Mood and Affect: Affect normal.        Result Review :   The following data was reviewed by: MALINDA Price on 06/15/2023:      Procedures    Assessment and Plan   Diagnoses and all orders for this visit:    1. Bilateral foot pain (Primary)  -     Ambulatory Referral to Podiatry      Consult another podiatrist for further evaluation and treatment.  If pain persist follow back in office will consider pain medication.  May take Tylenol ibuprofen as needed.  Encourage exercise and weight loss.        Follow Up   Return if symptoms worsen or fail to improve.  Patient was given instructions and counseling regarding his condition or for health maintenance advice. Please see specific information pulled into the AVS if appropriate.     Please note that portions of this note were completed with a voice recognition program.

## 2023-07-28 DIAGNOSIS — Z47.89 AFTERCARE FOLLOWING SURGERY OF THE MUSCULOSKELETAL SYSTEM: Primary | ICD-10-CM

## 2023-07-28 RX ORDER — AMOXICILLIN 500 MG/1
CAPSULE ORAL
Qty: 4 CAPSULE | Refills: 0 | Status: SHIPPED | OUTPATIENT
Start: 2023-07-28

## 2023-08-11 ENCOUNTER — OFFICE VISIT (OUTPATIENT)
Dept: ORTHOPEDIC SURGERY | Facility: CLINIC | Age: 63
End: 2023-08-11
Payer: MEDICARE

## 2023-08-11 VITALS
HEART RATE: 59 BPM | BODY MASS INDEX: 42.84 KG/M2 | OXYGEN SATURATION: 95 % | WEIGHT: 306 LBS | SYSTOLIC BLOOD PRESSURE: 134 MMHG | HEIGHT: 71 IN | DIASTOLIC BLOOD PRESSURE: 78 MMHG

## 2023-08-11 DIAGNOSIS — M25.562 LEFT KNEE PAIN, UNSPECIFIED CHRONICITY: ICD-10-CM

## 2023-08-11 DIAGNOSIS — Z47.89 AFTERCARE FOLLOWING SURGERY OF THE MUSCULOSKELETAL SYSTEM: Primary | ICD-10-CM

## 2023-08-11 NOTE — PROGRESS NOTES
"Chief Complaint  Follow-up of the Left Knee    Subjective          History of Present Illness      Vahid Bass is a 63 y.o. male  presents to [facility] for     Patient presents for annual evaluation of left total knee arthroplasty, 8/10/2022 and left knee manipulation 9/26/2022.  Patient states he has been doing well he states his only issue is when he sits for too long or sleeps and wakes up he has stiffness in his knee its goes away.  He denies pain.  He also has some ankle and foot problems this causes him to have a limp but feels like his knee is doing well, no new complaints of the knee..      No Known Allergies     Social History     Socioeconomic History    Marital status:    Tobacco Use    Smoking status: Never     Passive exposure: Yes    Smokeless tobacco: Never   Vaping Use    Vaping Use: Never used   Substance and Sexual Activity    Alcohol use: Yes     Comment: socially    Drug use: Never    Sexual activity: Defer        REVIEW OF SYSTEMS    Constitutional: Denies fevers, chills, weight loss  Cardiovascular: Denies chest pain, shortness of breath  Skin: Denies rashes, acute skin changes  Neurologic: Denies headache, loss of consciousness  MSK: Left knee pain      Objective   Vital Signs:   /78   Pulse 59   Ht 180.3 cm (71\")   Wt (!) 139 kg (306 lb)   SpO2 95%   BMI 42.68 kg/mý     Body mass index is 42.68 kg/mý.    Physical Exam    Left knee: Well-healed incision, no erythema, no ecchymosis, no swelling, no effusion, no signs of infection, full extension, flexion 110, stable anterior/posterior drawer, stable to varus/valgus stress.  Nontender to palpation, no pain with range of motion.  5 out of 5 strength, no pain with resisted range of motion    Procedures    Imaging Results (Most Recent)       Procedure Component Value Units Date/Time    XR Knee 3 View Left [[id number]] Resulted: 08/11/23 1218     Updated: 08/11/23 1218    Narrative:      " View:AP/Lateral and Sunrise view(s)  Site: Left knee  Indication: Left knee pain  Study: X-rays ordered, taken in the office, and reviewed today  X-ray: Intact appearing left total knee arthroplasty, no signs of hardware   failure or loosening, no subsidence or periprosthetic fracture, good   alignment.  Comparative data: No comparative data found             Result Review :   The following data was reviewed by: JASON Rodriguez on 08/11/2023:  Data reviewed : Radiologic studies reviewed by me with the patient              Assessment and Plan    Diagnoses and all orders for this visit:    1. Aftercare following surgery of left total knee arthroplasty , 8/10/22 and manipulation 9/26/22 (Primary)    2. Left knee pain, unspecified chronicity  -     XR Knee 3 View Left        Reviewed x-rays with the patient discussed diagnosis and treatment options with him he was advised to continue activity as tolerated follow-up in 1 year for recheck, patient agreed.    Call or return if worsening symptoms.    Follow Up   Return in about 1 year (around 8/11/2024) for Recheck.  Patient was given instructions and counseling regarding his condition or for health maintenance advice. Please see specific information pulled into the AVS if appropriate.

## 2023-08-15 ENCOUNTER — OFFICE VISIT (OUTPATIENT)
Dept: FAMILY MEDICINE CLINIC | Facility: CLINIC | Age: 63
End: 2023-08-15
Payer: MEDICARE

## 2023-08-15 VITALS
WEIGHT: 307 LBS | SYSTOLIC BLOOD PRESSURE: 120 MMHG | BODY MASS INDEX: 42.98 KG/M2 | DIASTOLIC BLOOD PRESSURE: 78 MMHG | HEART RATE: 63 BPM | HEIGHT: 71 IN | TEMPERATURE: 98.3 F | OXYGEN SATURATION: 97 %

## 2023-08-15 DIAGNOSIS — L42 PITYRIASIS ROSEA: Primary | ICD-10-CM

## 2023-08-15 DIAGNOSIS — H93.8X2 IRRITATION OF LEFT EAR: ICD-10-CM

## 2023-08-15 PROCEDURE — 99213 OFFICE O/P EST LOW 20 MIN: CPT | Performed by: NURSE PRACTITIONER

## 2023-08-15 NOTE — PROGRESS NOTES
"Chief Complaint  Rash (Chest rash x 2 weeks) and Ear Fullness (Feels like something is in his left ear)    Subjective         Vahid Bass presents to [facility]  Rash  This is a new problem. The current episode started in the past 7 days. The problem has been gradually improving since onset. The affected locations include the chest and torso. The rash is characterized by redness. He was exposed to nothing. Pertinent negatives include no anorexia, congestion, cough, diarrhea, eye pain, facial edema, fatigue, fever, joint pain, nail changes, rhinorrhea, shortness of breath, sore throat or vomiting. Past treatments include topical steroids.   Ear Fullness   Associated symptoms include a rash. Pertinent negatives include no coughing, diarrhea, rhinorrhea, sore throat or vomiting.    Patient described ear irritation where he felt like he insect was crawling out of his ear.  He flushed his ear with hydrogen peroxide.  Symptoms have resolved    Social History     Socioeconomic History    Marital status:    Tobacco Use    Smoking status: Never     Passive exposure: Yes    Smokeless tobacco: Never   Vaping Use    Vaping Use: Never used   Substance and Sexual Activity    Alcohol use: Yes     Comment: socially    Drug use: Never    Sexual activity: Defer        Objective     Vitals:    08/15/23 1257   BP: 120/78   BP Location: Left arm   Patient Position: Sitting   Cuff Size: Adult   Pulse: 63   Temp: 98.3 øF (36.8 øC)   TempSrc: Oral   SpO2: 97%   Weight: (!) 139 kg (307 lb)   Height: 180.3 cm (71\")        Body mass index is 42.82 kg/mý.    Wt Readings from Last 3 Encounters:   08/15/23 (!) 139 kg (307 lb)   08/11/23 (!) 139 kg (306 lb)   06/15/23 (!) 144 kg (317 lb 6.4 oz)       BP Readings from Last 3 Encounters:   08/15/23 120/78   08/11/23 134/78   06/15/23 138/84         Physical Exam  Vitals reviewed.   Constitutional:       Appearance: Normal appearance. He is well-developed. "   HENT:      Head: Normocephalic and atraumatic.      Right Ear: Tympanic membrane and external ear normal.      Left Ear: Tympanic membrane and external ear normal.      Mouth/Throat:      Pharynx: No oropharyngeal exudate.   Eyes:      Conjunctiva/sclera: Conjunctivae normal.      Pupils: Pupils are equal, round, and reactive to light.   Cardiovascular:      Rate and Rhythm: Normal rate and regular rhythm.      Heart sounds: No murmur heard.    No friction rub. No gallop.   Pulmonary:      Effort: Pulmonary effort is normal.      Breath sounds: Normal breath sounds. No wheezing or rhonchi.   Abdominal:      General: Bowel sounds are normal. There is no distension.      Palpations: Abdomen is soft.      Tenderness: There is no abdominal tenderness.   Skin:     General: Skin is warm and dry.      Findings: Rash present. Rash is macular (Scattered macular rash across the chest and torso, slightly larger rash on his abdomen).   Neurological:      Mental Status: He is alert and oriented to person, place, and time.   Psychiatric:         Mood and Affect: Mood and affect normal.         Behavior: Behavior normal.         Thought Content: Thought content normal.         Judgment: Judgment normal.        Result Review :   The following data was reviewed by: MALINDA Price on 08/15/2023:      Procedures    Assessment and Plan   Diagnoses and all orders for this visit:    1. Pityriasis rosea (Primary)    2. Irritation of left ear    Appearance of pityriasis rosea chest and abdomen.  Rash is slowly healing.  Denies itchiness.  Irritation of the left ear has improved.              Follow Up   Return if symptoms worsen or fail to improve.  Patient was given instructions and counseling regarding his condition or for health maintenance advice. Please see specific information pulled into the AVS if appropriate.     Please note that portions of this note were completed with a voice recognition program.

## 2023-08-23 DIAGNOSIS — R32 URINARY INCONTINENCE, UNSPECIFIED TYPE: Primary | ICD-10-CM

## 2023-08-23 DIAGNOSIS — R35.1 BENIGN PROSTATIC HYPERPLASIA WITH NOCTURIA: Chronic | ICD-10-CM

## 2023-08-23 DIAGNOSIS — N40.1 BENIGN PROSTATIC HYPERPLASIA WITH NOCTURIA: Chronic | ICD-10-CM

## 2023-08-23 NOTE — PROGRESS NOTES
PATIENT CAME INTO THE OFFICE STATING HE WOULD LIKE A UROLOGY REFERRAL FOR HIS URINARY INCONTINENCE  HE HAS SECONDARY TO HIS PROSTATE ISSUES. [facility] AND WOULD LIKE TO SEE [facility]OLOGY WITH [facility].    Quinolones Counseling:  I discussed with the patient the risks of fluoroquinolones including but not limited to GI upset, allergic reaction, drug rash, diarrhea, dizziness, photosensitivity, yeast infections, liver function test abnormalities, tendonitis/tendon rupture.

## 2023-09-19 NOTE — PROGRESS NOTES
"Chief Complaint: Benign Prostatic Hypertrophy and Nocturia    Subjective         History of Present Illness  Vahid Bass is a 63 y.o. male presents to [facility] to be seen for BPH/nocturia.    Patient presents reporting he was diagnosed with BPH 3 years ago. He increased his tamsulosin about 3 months ago due to nocturia and incontinence. He is still having some incontinence, but not as much since starting increased dose of tamsulosin.     Has been on finasteride for over 1 year.     He does use CPAP    Frequency- improved since starting increased dose of tamuslosin    Urgency- at night    Incontinence- at times, post void dribbling    Nocturia- 1-3    Stream- \"good\"    GH- denies     surgeries- denies    Family history of  malignancy- denies    Cardiopulmonary- denies    Anticoagulants- ASA 81 mg    Smoker- denies    PSA  12/30/2022 0.546- on finasteride = 1.09    Objective     Past Medical History:   Diagnosis Date    Allergies     Anemia     NO CURRENT S/S    Arthritis     BPH (benign prostatic hyperplasia)     DVT (deep venous thrombosis)     2011 LOWER LEG, RESOLVED, unable to recall side of dvt    Foot pain, right     Hyperlipidemia     Positive skin test for tuberculosis     or positive PPD; INH x 9 months       Past Surgical History:   Procedure Laterality Date    KNEE JOINT MANIPULATION Left 9/26/2022    Procedure: LEFT KNEE MANIPULATION;  Surgeon: Niles Amezquita MD;  Location: [facility] [place];  Service: Orthopedics;  Laterality: Left;    RHINOPLASTY  1999    TONSILLECTOMY  1999    TOTAL KNEE ARTHROPLASTY Left 8/10/2022    Procedure: LEFT TOTAL KNEE ARTHROPLASTY WITH NETTE ROBOT;  Surgeon: Niles Amezquita MD;  Location: [facility] MAIN OR;  Service: Robotics - Ortho;  Laterality: Left;         Current Outpatient Medications:     aspirin 81 MG chewable tablet, Chew 1 tablet Daily. LAST DOSE 09/23/22 PER PT, Disp: , Rfl:     Cholecalciferol (Vitamin D-3) 25 MCG (1000 UT) " "capsule, Take 1,000 Units by mouth Every 7 (Seven) Days. TAKES ON MONDAYS, INST PER ANESTHESIA PROTOCOL, Disp: , Rfl:     Cyanocobalamin (HM VITAMIN B12 PO), Take 1 tablet by mouth Daily., Disp: , Rfl:     tamsulosin (FLOMAX) 0.4 MG capsule 24 hr capsule, Take 2 capsules by mouth Daily., Disp: , Rfl:     finasteride (PROSCAR) 5 MG tablet, Take 1 tablet by mouth Daily., Disp: , Rfl:     rosuvastatin (CRESTOR) 20 MG tablet, Take 1 tablet by mouth Daily., Disp: , Rfl:     No Known Allergies     Family History   Problem Relation Age of Onset    Cancer Mother         ovarian    Stroke Mother     Ovarian cancer Mother     Diabetes Father     Heart failure Sister     Heart disease Brother     Malig Hyperthermia Neg Hx        Social History     Socioeconomic History    Marital status:    Tobacco Use    Smoking status: Never     Passive exposure: Yes    Smokeless tobacco: Never   Vaping Use    Vaping Use: Never used   Substance and Sexual Activity    Alcohol use: Yes     Comment: socially    Drug use: Never    Sexual activity: Defer       Vital Signs:   Resp 18   Ht 180.3 cm (71\")   Wt (!) 139 kg (306 lb)   BMI 42.68 kg/m²      Physical Exam  Vitals reviewed.   Constitutional:       Appearance: Normal appearance.   Neurological:      General: No focal deficit present.      Mental Status: He is alert and oriented to person, place, and time.   Psychiatric:         Mood and Affect: Mood normal.         Behavior: Behavior normal.        Result Review :   The following data was reviewed by: MALINDA Nichols on 09/20/2023:  Results for orders placed or performed in visit on 09/20/23   Bladder Scan   Result Value Ref Range    Urine Volume 0         Bladder Scan interpretation 09/20/2023    Estimation of residual urine via BVI 3000 Verathon Bladder Scan  MA/nurse performing: Vonda HUSAIN MA  Residual Urine: 0 ml  Indication: Benign prostatic hyperplasia with incomplete bladder emptying   Position: " Supine  Examination: Incremental scanning of the suprapubic area using 2.0 MHz transducer using copious amounts of acoustic gel.   Findings: An anechoic area was demonstrated which represented the bladder, with measurement of residual urine as noted. I inspected this myself. In that the residual urine was insignificant, refer to plan for treatment and plan necessary at this time.           Procedures        Assessment and Plan    Diagnoses and all orders for this visit:    1. Benign prostatic hyperplasia with incomplete bladder emptying (Primary)  -     Bladder Scan    BPH with Luts- behavioral modifications including fluid management, limiting fluids prior to sleep, and voiding immediately prior to sleep.      Continue conservative management of BPH Luts via behavioral modifications and medications; monitor for adverse outcomes of BPH which would warrant discussion of further management (ie hydronephrosis, recurrent uti, bladder stones, urinary retention, or renal insufficiency)     Continue Flomax and Finasteride.     Discussed prostate procedures to include TURP, Rezum, and Aquablation. I gave him information brochures for both Rezum and Aquablation as well as an IPSS score card.     He will follow-up in 3 months with repeat PVR to ensure that he is still emptying his bladder well.  We did discuss that at this point procedures would be totally up to him since he is currently having 0 on his PVR.  He has been researched options available and then discuss again at his 3-month visit whether he is ready to make that decision.  We did discuss that he would need to have a cystoscopy completed before any procedures would be done.          Follow Up   Return in about 3 months (around 12/20/2023) for with PVR.  Patient was given instructions and counseling regarding his condition or for health maintenance advice. Please see specific information pulled into the AVS if appropriate.         This document has been  electronically signed by Ana Sparrow, APRN  September 20, 2023 10:01 EDT

## 2023-09-20 ENCOUNTER — OFFICE VISIT (OUTPATIENT)
Dept: UROLOGY | Facility: CLINIC | Age: 63
End: 2023-09-20
Payer: MEDICARE

## 2023-09-20 VITALS — BODY MASS INDEX: 42.84 KG/M2 | WEIGHT: 306 LBS | RESPIRATION RATE: 18 BRPM | HEIGHT: 71 IN

## 2023-09-20 DIAGNOSIS — R39.14 BENIGN PROSTATIC HYPERPLASIA WITH INCOMPLETE BLADDER EMPTYING: Primary | ICD-10-CM

## 2023-09-20 DIAGNOSIS — N40.1 BENIGN PROSTATIC HYPERPLASIA WITH INCOMPLETE BLADDER EMPTYING: Primary | ICD-10-CM

## 2023-09-20 LAB — URINE VOLUME: 0

## 2023-09-20 PROCEDURE — 1159F MED LIST DOCD IN RCRD: CPT | Performed by: NURSE PRACTITIONER

## 2023-09-20 PROCEDURE — 1160F RVW MEDS BY RX/DR IN RCRD: CPT | Performed by: NURSE PRACTITIONER

## 2023-09-20 PROCEDURE — 99203 OFFICE O/P NEW LOW 30 MIN: CPT | Performed by: NURSE PRACTITIONER

## 2023-09-20 RX ORDER — FINASTERIDE 5 MG/1
5 TABLET, FILM COATED ORAL DAILY
COMMUNITY

## 2023-09-20 RX ORDER — ROSUVASTATIN CALCIUM 20 MG/1
20 TABLET, COATED ORAL DAILY
COMMUNITY

## 2023-10-03 ENCOUNTER — OFFICE VISIT (OUTPATIENT)
Dept: FAMILY MEDICINE CLINIC | Facility: CLINIC | Age: 63
End: 2023-10-03
Payer: MEDICARE

## 2023-10-03 VITALS
OXYGEN SATURATION: 99 % | BODY MASS INDEX: 42.8 KG/M2 | DIASTOLIC BLOOD PRESSURE: 76 MMHG | HEART RATE: 63 BPM | HEIGHT: 71 IN | WEIGHT: 305.7 LBS | TEMPERATURE: 98.3 F | SYSTOLIC BLOOD PRESSURE: 126 MMHG

## 2023-10-03 DIAGNOSIS — G89.29 CHRONIC PAIN OF RIGHT KNEE: ICD-10-CM

## 2023-10-03 DIAGNOSIS — M79.672 BILATERAL FOOT PAIN: Primary | ICD-10-CM

## 2023-10-03 DIAGNOSIS — M21.42 PES PLANUS OF BOTH FEET: ICD-10-CM

## 2023-10-03 DIAGNOSIS — N40.1 BENIGN PROSTATIC HYPERPLASIA WITH LOWER URINARY TRACT SYMPTOMS, SYMPTOM DETAILS UNSPECIFIED: Chronic | ICD-10-CM

## 2023-10-03 DIAGNOSIS — M79.671 BILATERAL FOOT PAIN: Primary | ICD-10-CM

## 2023-10-03 DIAGNOSIS — Z96.652 S/P TOTAL KNEE ARTHROPLASTY, LEFT: ICD-10-CM

## 2023-10-03 DIAGNOSIS — M25.572 CHRONIC PAIN OF BOTH ANKLES: ICD-10-CM

## 2023-10-03 DIAGNOSIS — M25.561 CHRONIC PAIN OF RIGHT KNEE: ICD-10-CM

## 2023-10-03 DIAGNOSIS — E55.9 VITAMIN D DEFICIENCY: ICD-10-CM

## 2023-10-03 DIAGNOSIS — Z13.220 SCREENING FOR LIPID DISORDERS: ICD-10-CM

## 2023-10-03 DIAGNOSIS — M25.571 CHRONIC PAIN OF BOTH ANKLES: ICD-10-CM

## 2023-10-03 DIAGNOSIS — Z23 NEED FOR INFLUENZA VACCINATION: ICD-10-CM

## 2023-10-03 DIAGNOSIS — D64.9 ANEMIA, UNSPECIFIED TYPE: ICD-10-CM

## 2023-10-03 DIAGNOSIS — E78.5 HYPERLIPIDEMIA, UNSPECIFIED HYPERLIPIDEMIA TYPE: ICD-10-CM

## 2023-10-03 DIAGNOSIS — G89.29 CHRONIC PAIN OF BOTH ANKLES: ICD-10-CM

## 2023-10-03 DIAGNOSIS — M21.41 PES PLANUS OF BOTH FEET: ICD-10-CM

## 2023-10-03 DIAGNOSIS — E53.8 B12 DEFICIENCY: ICD-10-CM

## 2023-10-03 DIAGNOSIS — Z51.81 MEDICATION MONITORING ENCOUNTER: ICD-10-CM

## 2023-10-03 RX ORDER — ERGOCALCIFEROL 1.25 MG/1
50000 CAPSULE ORAL
COMMUNITY

## 2023-10-03 NOTE — PROGRESS NOTES
"Chief Complaint  Establish care      Subjective        Vahid Bass presents to [facility]  History of Present Illness  Patient presents today to reestablish care with myself.  He was previously seen by MALINDA Mondragon who has now left our practice.  I saw patient previously however due to scheduling conflicts he was not able to see me in office.  He reports being retired now.  He does have ongoing issues with bilateral ankle and foot pain.  He does have custom orthotics.  He was previously seen by Dr. Velazquez and they had discussed consideration for surgical treatment options given ongoing issues.  Per report they had discussed ultimately requiring staged bilateral pantalar fusions.  He had discussed getting the right knee replaced prior to considering ankle surgery.  At this point patient is still trying to figure out what he would like to do.  He is not quite ready to have his right knee replaced yet.  He has had his left knee replaced.  He is being followed at the office of Korey Aponte.  I reviewed his medication list.  He is taking Proscar and Flomax for BPH.  He is being followed by urology and does have an appointment again in December with Ana Sparrow.  He is due for a flu vaccination today so we discussed getting this done.  He is due for labs however [facility]  I did offer today but again he declines.  He was previously noted to have some anemia with a hemoglobin at 12.4.  He is due for colonoscopy but [facility] that [facility].  His last colonoscopy was in February 2018 with a 5-year follow-up recommended.  He continues to take vitamin D 50,000 units once weekly for deficiency.  He is taking Crestor 20 mg daily for hyperlipidemia.  Objective   Vital Signs:  /76   Pulse 63   Temp 98.3 °F (36.8 °C)   Ht 180.3 cm (71\")   Wt (!) 139 kg (305 lb 11.2 oz)   SpO2 99%   BMI 42.64 kg/m²   Estimated body mass " "index is 42.64 kg/m² as calculated from the following:    Height as of this encounter: 180.3 cm (71\").    Weight as of this encounter: 139 kg (305 lb 11.2 oz).               Physical Exam   General: AAO ×3, no acute distress, pleasant  HEENT: Normocephalic, atraumatic  Cardiovascular: Regular rate and rhythm without appreciable murmur  Respiratory: Clear to auscultation bilaterally no RRW  Gastrointestinal: Soft nontender nondistended with bowel sounds present  extremities: No edema  Musculoskeletal: Pes planus noted in the lower extremities.  Inward ankle deformity noted bilaterally.  Neurologic: CN II through XII grossly intact   Psychiatric: Normal mood and affect  Result Review :                   Assessment and Plan   Diagnoses and all orders for this visit:    1. Bilateral foot pain (Primary)  -     Ambulatory Referral to Orthopedic Surgery    2. Vitamin D deficiency    3. B12 deficiency    4. Benign prostatic hyperplasia with lower urinary tract symptoms, symptom details unspecified    5. Hyperlipidemia, unspecified hyperlipidemia type    6. S/P total knee arthroplasty, left    7. Chronic pain of right knee    8. Anemia, unspecified type    9. Medication monitoring encounter    10. Screening for lipid disorders    11. Chronic pain of both ankles  -     Ambulatory Referral to Orthopedic Surgery    12. Pes planus of both feet  -     Ambulatory Referral to Orthopedic Surgery    13. Need for influenza vaccination  -     Fluzone High-Dose 65+yrs ([id number])    Plan as documented above.  I strongly encouraged him to get back into see Dr. Velazquez in regards to treatment options.  He was supposed to follow-up but has not done so yet.  I did offer labs but he would like to hold off at this time.  I would like to see him back in 3 months or sooner if needed.  Flu vaccination to be provided today.         Follow Up   Return in about 3 months (around 1/3/2024) for foot pain.  Patient was given instructions and " counseling regarding his condition or for health maintenance advice. Please see specific information pulled into the AVS if appropriate.

## 2023-12-20 ENCOUNTER — TELEPHONE (OUTPATIENT)
Dept: UROLOGY | Facility: CLINIC | Age: 63
End: 2023-12-20
Payer: MEDICARE

## 2023-12-20 NOTE — TELEPHONE ENCOUNTER
CALLED PT TO OFFER R/S OF CX'D APPT 12/18 W/ DARREN    SPOKE W/ PT WHO DECLINED TO R/S STATING HE WANTED TO THINK THINGS THROUGH    ANYTHING ELSE TO DO?

## 2024-03-04 DIAGNOSIS — Z47.89 AFTERCARE FOLLOWING SURGERY OF THE MUSCULOSKELETAL SYSTEM: Primary | ICD-10-CM

## 2024-03-04 RX ORDER — AMOXICILLIN 500 MG/1
2000 CAPSULE ORAL
Qty: 4 CAPSULE | Refills: 0 | Status: SHIPPED | OUTPATIENT
Start: 2024-03-04 | End: 2024-03-04

## 2024-08-15 ENCOUNTER — OFFICE VISIT (OUTPATIENT)
Dept: ORTHOPEDIC SURGERY | Facility: CLINIC | Age: 64
End: 2024-08-15
Payer: MEDICARE

## 2024-08-15 VITALS
BODY MASS INDEX: 40.6 KG/M2 | OXYGEN SATURATION: 97 % | WEIGHT: 290 LBS | HEIGHT: 71 IN | DIASTOLIC BLOOD PRESSURE: 77 MMHG | SYSTOLIC BLOOD PRESSURE: 124 MMHG | HEART RATE: 57 BPM

## 2024-08-15 DIAGNOSIS — Z47.89 AFTERCARE FOLLOWING SURGERY OF THE MUSCULOSKELETAL SYSTEM: Primary | ICD-10-CM

## 2024-08-15 NOTE — PROGRESS NOTES
"Chief Complaint  Follow-up of the Left Knee    Subjective          History of Present Illness      Vahid Bass is a 64 y.o. male  presents to [facility] for     Patient presents for follow-up evaluation of left total knee arthroplasty 8/10/2022 and left knee manipulation 9/26/2022.  Patient states that his knee continues to do well he states he gets a better day by day and year by year he states he is doing activities around the house like cutting his own grass he is also keeping up with his wife when they take walks.  He states he might have stiffness when he sits or stands too long he also might have stiffness in the morning but once he gets moving he is able to be fully mobile.  He denies pain or need for pain medication/NSAIDs.      No Known Allergies     Social History     Socioeconomic History    Marital status:    Tobacco Use    Smoking status: Never     Passive exposure: Yes    Smokeless tobacco: Never   Vaping Use    Vaping status: Never Used   Substance and Sexual Activity    Alcohol use: Yes     Comment: socially    Drug use: Never    Sexual activity: Defer        REVIEW OF SYSTEMS    Constitutional: Awake alert and oriented x3, no acute distress, denies fevers, chills, weight loss  Respiratory: No respiratory distress  Vascular: Brisk cap refill, Intact distal pulses, No cyanosis, compartments soft with no signs or symptoms of compartment syndrome or DVT.   Cardiovascular: Denies chest pain, shortness of breath  Skin: Denies rashes, acute skin changes  Neurologic: Denies headache, loss of consciousness  MSK: Left knee pain      Objective   Vital Signs:   /77   Pulse 57   Ht 180.3 cm (70.98\")   Wt 132 kg (290 lb)   SpO2 97%   BMI 40.47 kg/m²     Body mass index is 40.47 kg/m².    Physical Exam       Left knee: Well healed incision, no erythema, no ecchymosis, no swelling, no effusion, no signs of infection, full extension, flexion 115, stable " anterior/posterior drawer, stable to varus/valgus stress.  Nontender to palpation, no pain with range of motion.  5 out of 5 strength, no pain with resisted range of motion, neurovascularly intact      Procedures    Imaging Results (Most Recent)       Procedure Component Value Units Date/Time    XR Knee 3 View Left [[id number]] Resulted: 08/15/24 0822     Updated: 08/15/24 0822    Narrative:      View:AP/Lateral and Sunrise view(s)  Site: Left knee  Indication: Left knee pain  Study: X-rays ordered, taken in the office, and reviewed today  X-ray: Intact appearing left total knee arthroplasty, no signs of hardware   failure or loosening, no subsidence or periprosthetic fracture, good   alignment  Comparative data: Previous studies             Result Review :   The following data was reviewed by: JASON Rodriguez on 08/15/2024:  Data reviewed : Radiologic studies reviewed by me with the patient              Assessment and Plan    Diagnoses and all orders for this visit:    1. Aftercare following surgery of left total knee arthroplasty, 8/10/2022 and left knee manipulation 9/26/2022 (Primary)  -     XR Knee 3 View Left        Reviewed x-rays with the patient advised him to continue activity and weightbearing as tolerated if any new or concerning symptoms occur call right away otherwise follow-up as needed.  He states he is going to moved to [place] sometime in May    Call or return if worsening symptoms.    Follow Up   Return if symptoms worsen or fail to improve.  Patient was given instructions and counseling regarding his condition or for health maintenance advice. Please see specific information pulled into the AVS if appropriate.       EMR Dragon/Transcription disclaimer:  Part of this note may be an electronic transcription/translation of spoken language to printed text using the Dragon Dictation System

## (undated) DEVICE — SUT VIC 2/0 CT1 36IN

## (undated) DEVICE — 450 ML BOTTLE OF 0.05% CHLORHEXIDINE GLUCONATE IN 99.95% STERILE WATER FOR IRRIGATION, USP AND APPLICATOR.: Brand: IRRISEPT ANTIMICROBIAL WOUND LAVAGE

## (undated) DEVICE — PULLOVER TOGA, 2X LARGE: Brand: FLYTE, SURGICOOL

## (undated) DEVICE — PEEL-AWAY TOGA, 2X LARGE: Brand: FLYTE

## (undated) DEVICE — DISPOSABLE TOURNIQUET CUFF SINGLE BLADDER, SINGLE PORT AND QUICK CONNECT CONNECTOR: Brand: COLOR CUFF

## (undated) DEVICE — Device: Brand: ASSURA

## (undated) DEVICE — PENCL E/S SMOKEEVAC W/TELESCP CANN

## (undated) DEVICE — 3 BONE CEMENT MIXER: Brand: MIXEVAC

## (undated) DEVICE — GAUZE,SPONGE,4"X4",16PLY,STRL,LF,10/TRAY: Brand: MEDLINE

## (undated) DEVICE — GLV SURG SENSICARE PI ORTHO SZ8 LF STRL

## (undated) DEVICE — TOWEL,OR,DSP,ST,BLUE,STD,4/PK,20PK/CS: Brand: MEDLINE

## (undated) DEVICE — STERILE PATIENT PROTECTIVE PAD FOR IMP® KNEE POSITIONERS & COHESIVE WRAP (10 / CASE): Brand: DE MAYO KNEE POSITIONER®

## (undated) DEVICE — TOTAL KNEE-LF: Brand: MEDLINE INDUSTRIES, INC.

## (undated) DEVICE — UNDERCAST PADDING: Brand: DEROYAL

## (undated) DEVICE — APPL CHLORAPREP HI/LITE 26ML ORNG

## (undated) DEVICE — DRSNG PAD ABD 8X10IN STRL

## (undated) DEVICE — BNDG COTN/ELAS FLEXMASTER DBL/LENGTH CLIP/CLS 6IN 11YD

## (undated) DEVICE — COVER,LIGHT HANDLE,FLX,1/PK: Brand: MEDLINE INDUSTRIES, INC.

## (undated) DEVICE — STRYKER PERFORMANCE SERIES SAGITTAL BLADE: Brand: STRYKER PERFORMANCE SERIES

## (undated) DEVICE — DRP ROBOTIC ROSA BX/20

## (undated) DEVICE — 3M™ STERI-DRAPE™ U-DRAPE 1015: Brand: STERI-DRAPE™

## (undated) DEVICE — SUT ETHLN 3/0 FS1 663G

## (undated) DEVICE — ELECTRD BLD EDGE COAT 3IN

## (undated) DEVICE — SCRW HEX PERSONA FML 2.5X25MM PK/2
Type: IMPLANTABLE DEVICE | Site: KNEE | Status: NON-FUNCTIONAL
Removed: 2022-08-10

## (undated) DEVICE — INTENDED FOR TISSUE SEPARATION, AND OTHER PROCEDURES THAT REQUIRE A SHARP SURGICAL BLADE TO PUNCTURE OR CUT.: Brand: BARD-PARKER ® CARBON RIB-BACK BLADES

## (undated) DEVICE — FAN SPRAY KIT: Brand: PULSAVAC®

## (undated) DEVICE — NDL HYPO ECLPS SFTY 18G 1 1/2IN

## (undated) DEVICE — NO-SCRATCH ™ SMALL WHITNEY CURETTE ™ IS A SINGLE-USE, PLASTIC CURETTE FOR QUICKLY APPLYING, MANIPULATING AND REMOVING BONE CEMENT DURING HIP AND KNEE REPLACEMENT SURGERY. THE PLASTIC IS SOFTER THAN STEEL INSTRUMENTS, REDUCING THE RISK OF DAMAGING THE PROSTHESIS WITH METAL INSTRUMENTS.  THE CURETTE’S 6MM TIP REMOVES EXCESS CEMENT FROM REPLACEMENT HIPS AND KNEES. EASY-TO-MANEUVER, THE SMALL BLUE CURETTE LETS YOU REMOVE CEMENT FROM ALL EDGES OF THE PROSTHESIS.NO-SCRATCH WHITNEY SMALL CURETTE FEATURES:SAFER THAN STEEL- MADE OF PLASTIC - STURDY YET SOFTER THAN SURGICAL STEEL.HANDIER- EACH TOOL HAS A MOLDED-IN THUMB INDENTATION INSTANTLY ORIENTING THE TOOL.- EASIER TO MANEUVER IN HARD TO SEE PLACES.- COLOR-CODED FOR EASY IDENTIFICATION.FASTER- COMES INDIVIDUALLY PACKAGED IN STERILE, PEEL OPEN POUCH, READY TO GO.- APPLIES, MANIPULATES, OR REMOVES CEMENT WITH FINGERTIP PRECISION.ECONOMICAL- THE COST OF A SINGLE REVISION DWARFS THE COST OF A SINGLE-USE CURETTE. - DISPOSABLE – THERE’S NO NEED TO WASTE TIME REMOVING HARDENED CEMENT OR RE-STERILIZING TOOLS.- LESS EXPENSIVE TO BUY AND INVENTORY - ORDER ONLY THE TOOL YOU USE.- PACKAGED 25 INDIVIDUALLY WRAPPED TOOLS TO A CARTON FOR CONVENIENT SHELF STORAGE.: Brand: WHITNEY NO-SCRATCH CURETTE (SMALL)

## (undated) DEVICE — SLV SCD KN/LEN ADJ EXPRSS BLENDED MD 1P/U

## (undated) DEVICE — PROXIMATE RH ROTATING HEAD SKIN STAPLERS (35 WIDE) CONTAINS 35 STAINLESS STEEL STAPLES: Brand: PROXIMATE

## (undated) DEVICE — SYR LUERLOK 30CC

## (undated) DEVICE — STERILE POLYISOPRENE POWDER-FREE SURGICAL GLOVES: Brand: PROTEXIS

## (undated) DEVICE — SOL IRR NACL 0.9PCT 3000ML

## (undated) DEVICE — CVR LEG BOOTLEG F/R NOSKID 33IN

## (undated) DEVICE — ANTIBACTERIAL VIOLET BRAIDED (POLYGLACTIN 910), SYNTHETIC ABSORBABLE SURGICAL SUTURE: Brand: COATED VICRYL

## (undated) DEVICE — DRSNG SURESITE WNDW 4X4.5

## (undated) DEVICE — DRSNG GZ PETROLTM XEROFORM CURAD 1X8IN STRL

## (undated) DEVICE — BASIC SINGLE BASIN-LF: Brand: MEDLINE INDUSTRIES, INC.

## (undated) DEVICE — STERILE POLYISOPRENE POWDER-FREE SURGICAL GLOVES WITH EMOLLIENT COATING: Brand: PROTEXIS

## (undated) DEVICE — MEDI-VAC NON-CONDUCTIVE SUCTION TUBING: Brand: CARDINAL HEALTH

## (undated) DEVICE — MAT FLR ABS W/BLU/LINER 56X72IN WHT